# Patient Record
Sex: MALE | Race: WHITE | NOT HISPANIC OR LATINO
[De-identification: names, ages, dates, MRNs, and addresses within clinical notes are randomized per-mention and may not be internally consistent; named-entity substitution may affect disease eponyms.]

---

## 2017-01-30 ENCOUNTER — RESULT REVIEW (OUTPATIENT)
Age: 77
End: 2017-01-30

## 2017-01-30 ENCOUNTER — OUTPATIENT (OUTPATIENT)
Dept: OUTPATIENT SERVICES | Facility: HOSPITAL | Age: 77
LOS: 1 days | Discharge: HOME | End: 2017-01-30

## 2017-01-30 DIAGNOSIS — C49.9 MALIGNANT NEOPLASM OF CONNECTIVE AND SOFT TISSUE, UNSPECIFIED: ICD-10-CM

## 2017-01-30 DIAGNOSIS — C85.90 NON-HODGKIN LYMPHOMA, UNSPECIFIED, UNSPECIFIED SITE: ICD-10-CM

## 2017-01-30 DIAGNOSIS — C61 MALIGNANT NEOPLASM OF PROSTATE: ICD-10-CM

## 2017-01-31 ENCOUNTER — LABORATORY RESULT (OUTPATIENT)
Age: 77
End: 2017-01-31

## 2017-01-31 ENCOUNTER — APPOINTMENT (OUTPATIENT)
Dept: HEMATOLOGY ONCOLOGY | Facility: CLINIC | Age: 77
End: 2017-01-31

## 2017-01-31 ENCOUNTER — OUTPATIENT (OUTPATIENT)
Dept: OUTPATIENT SERVICES | Facility: HOSPITAL | Age: 77
LOS: 1 days | End: 2017-01-31
Payer: MEDICARE

## 2017-01-31 VITALS
WEIGHT: 149.91 LBS | TEMPERATURE: 98.4 F | RESPIRATION RATE: 16 BRPM | OXYGEN SATURATION: 99 % | HEART RATE: 80 BPM | SYSTOLIC BLOOD PRESSURE: 137 MMHG | DIASTOLIC BLOOD PRESSURE: 83 MMHG

## 2017-01-31 DIAGNOSIS — D64.9 ANEMIA, UNSPECIFIED: ICD-10-CM

## 2017-01-31 LAB
BLD GP AB SCN SERPL QL: NEGATIVE — SIGNIFICANT CHANGE UP
DAT POLY-SP REAG RBC QL: NEGATIVE — SIGNIFICANT CHANGE UP
HCT VFR BLD CALC: 32.4 % — LOW (ref 39–50)
HGB BLD-MCNC: 9.7 G/DL — LOW (ref 13–17)
MCHC RBC-ENTMCNC: 24.2 PG — LOW (ref 27–34)
MCHC RBC-ENTMCNC: 30 GM/DL — LOW (ref 32–36)
MCV RBC AUTO: 80.8 FL — SIGNIFICANT CHANGE UP (ref 80–100)
PLATELET # BLD AUTO: 294 K/UL — SIGNIFICANT CHANGE UP (ref 150–400)
RBC # BLD: 4.01 M/UL — LOW (ref 4.2–5.8)
RBC # FLD: 14.4 % — SIGNIFICANT CHANGE UP (ref 10.3–14.5)
RH IG SCN BLD-IMP: NEGATIVE — SIGNIFICANT CHANGE UP
WBC # BLD: 6.1 K/UL — SIGNIFICANT CHANGE UP (ref 3.8–10.5)
WBC # FLD AUTO: 6.1 K/UL — SIGNIFICANT CHANGE UP (ref 3.8–10.5)

## 2017-01-31 PROCEDURE — 86900 BLOOD TYPING SEROLOGIC ABO: CPT

## 2017-01-31 PROCEDURE — 86901 BLOOD TYPING SEROLOGIC RH(D): CPT

## 2017-01-31 PROCEDURE — 86850 RBC ANTIBODY SCREEN: CPT

## 2017-01-31 PROCEDURE — 86880 COOMBS TEST DIRECT: CPT

## 2017-02-02 ENCOUNTER — RESULT REVIEW (OUTPATIENT)
Age: 77
End: 2017-02-02

## 2017-02-03 ENCOUNTER — APPOINTMENT (OUTPATIENT)
Dept: HEMATOLOGY ONCOLOGY | Facility: CLINIC | Age: 77
End: 2017-02-03

## 2017-02-03 LAB
ERYTHROCYTE [SEDIMENTATION RATE] IN BLOOD: 114 MM/HR — HIGH (ref 0–20)
HCT VFR BLD CALC: 29.8 % — LOW (ref 39–50)
HGB BLD-MCNC: 9.3 G/DL — LOW (ref 13–17)
MCHC RBC-ENTMCNC: 25 PG — LOW (ref 27–34)
MCHC RBC-ENTMCNC: 31.1 GM/DL — LOW (ref 32–36)
MCV RBC AUTO: 80.3 FL — SIGNIFICANT CHANGE UP (ref 80–100)
PLATELET # BLD AUTO: 316 K/UL — SIGNIFICANT CHANGE UP (ref 150–400)
RBC # BLD: 3.71 M/UL — LOW (ref 4.2–5.8)
RBC # FLD: 14.3 % — SIGNIFICANT CHANGE UP (ref 10.3–14.5)
RETICS #: 28.6 K/UL — SIGNIFICANT CHANGE UP (ref 25–125)
RETICS/RBC NFR: 0.8 %/6 HR — SIGNIFICANT CHANGE UP (ref 0.5–2.5)
WBC # BLD: 7.4 K/UL — SIGNIFICANT CHANGE UP (ref 3.8–10.5)
WBC # FLD AUTO: 7.4 K/UL — SIGNIFICANT CHANGE UP (ref 3.8–10.5)

## 2017-02-07 LAB
ALBUMIN MFR SERPL ELPH: 39.2 %
ALBUMIN SERPL ELPH-MCNC: 3.3 G/DL
ALBUMIN SERPL-MCNC: 2.6 G/DL
ALBUMIN/GLOB SERPL: 0.6 RATIO
ALP BLD-CCNC: 96 U/L
ALPHA1 GLOB MFR SERPL ELPH: 8.5 %
ALPHA1 GLOB SERPL ELPH-MCNC: 0.6 G/DL
ALPHA2 GLOB MFR SERPL ELPH: 20.8 %
ALPHA2 GLOB SERPL ELPH-MCNC: 1.4 G/DL
ALT SERPL-CCNC: 14 U/L
ANION GAP SERPL CALC-SCNC: 14 MMOL/L
APPEARANCE: CLEAR
AST SERPL-CCNC: 17 U/L
B-GLOBULIN MFR SERPL ELPH: 12.7 %
B-GLOBULIN SERPL ELPH-MCNC: 0.9 G/DL
BACTERIA: NEGATIVE
BILIRUB SERPL-MCNC: 0.2 MG/DL
BILIRUBIN URINE: NEGATIVE
BLOOD URINE: NEGATIVE
BUN SERPL-MCNC: 23 MG/DL
CALCIUM SERPL-MCNC: 8.9 MG/DL
CHLORIDE SERPL-SCNC: 101 MMOL/L
CO2 SERPL-SCNC: 27 MMOL/L
COLOR: YELLOW
CREAT SERPL-MCNC: 1.19 MG/DL
DEPRECATED KAPPA LC FREE/LAMBDA SER: 0.05 RATIO
GAMMA GLOB FLD ELPH-MCNC: 1.3 G/DL
GAMMA GLOB MFR SERPL ELPH: 18.8 %
GLUCOSE QUALITATIVE U: NORMAL MG/DL
GLUCOSE SERPL-MCNC: 95 MG/DL
HAPTOGLOB SERPL-MCNC: 653 MG/DL
HYALINE CASTS: 0 /LPF
IGA SER QL IEP: 152 MG/DL
IGG SER QL IEP: 1350 MG/DL
IGM SER QL IEP: 134 MG/DL
INTERPRETATION SERPL IEP-IMP: NORMAL
IRON SATN MFR SERPL: 8 %
IRON SERPL-MCNC: 16 UG/DL
KAPPA LC CSF-MCNC: 51.5 MG/DL
KAPPA LC SERPL-MCNC: 2.45 MG/DL
KETONES URINE: NEGATIVE
LDH SERPL-CCNC: 282 U/L
LEUKOCYTE ESTERASE URINE: NEGATIVE
M PROTEIN MFR SERPL ELPH: NORMAL %
M PROTEIN SPEC IFE-MCNC: NORMAL
MICROSCOPIC-UA: NORMAL
MONOCLON BAND OBS SERPL: NORMAL G/DL
NITRITE URINE: NEGATIVE
PH URINE: 6
POTASSIUM SERPL-SCNC: 5.2 MMOL/L
PROT SERPL-MCNC: 6.7 G/DL
PROT SERPL-MCNC: 6.7 G/DL
PROT SERPL-MCNC: 6.8 G/DL
PROTEIN URINE: NEGATIVE MG/DL
PSA FREE FLD-MCNC: NORMAL %
PSA FREE SERPL-MCNC: <0.01 NG/ML
PSA SERPL-MCNC: NORMAL NG/ML
RED BLOOD CELLS URINE: 0 /HPF
SODIUM SERPL-SCNC: 142 MMOL/L
SPECIFIC GRAVITY URINE: 1.01
SQUAMOUS EPITHELIAL CELLS: 0 /HPF
TIBC SERPL-MCNC: 191 UG/DL
UIBC SERPL-MCNC: 175 UG/DL
UROBILINOGEN URINE: NORMAL MG/DL
WHITE BLOOD CELLS URINE: 0 /HPF

## 2017-02-08 LAB
ABR COMMENT: NORMAL
PNH GRANULOCYTES: 0 %
PNH MONOCYTES: 0 %
PNH RBC - COMPLETE: 0 %
PNH RBC - PARTIAL: 0 %

## 2017-02-13 ENCOUNTER — FORM ENCOUNTER (OUTPATIENT)
Age: 77
End: 2017-02-13

## 2017-02-14 ENCOUNTER — OUTPATIENT (OUTPATIENT)
Dept: OUTPATIENT SERVICES | Facility: HOSPITAL | Age: 77
LOS: 1 days | End: 2017-02-14
Payer: MEDICARE

## 2017-02-14 ENCOUNTER — APPOINTMENT (OUTPATIENT)
Dept: CT IMAGING | Facility: IMAGING CENTER | Age: 77
End: 2017-02-14

## 2017-02-14 DIAGNOSIS — C61 MALIGNANT NEOPLASM OF PROSTATE: ICD-10-CM

## 2017-02-14 DIAGNOSIS — D64.9 ANEMIA, UNSPECIFIED: ICD-10-CM

## 2017-02-14 DIAGNOSIS — Z85.71 PERSONAL HISTORY OF HODGKIN LYMPHOMA: ICD-10-CM

## 2017-02-14 DIAGNOSIS — C49.9 MALIGNANT NEOPLASM OF CONNECTIVE AND SOFT TISSUE, UNSPECIFIED: ICD-10-CM

## 2017-02-14 PROCEDURE — 71260 CT THORAX DX C+: CPT

## 2017-02-14 PROCEDURE — 74177 CT ABD & PELVIS W/CONTRAST: CPT

## 2017-02-16 ENCOUNTER — APPOINTMENT (OUTPATIENT)
Dept: INFUSION THERAPY | Facility: HOSPITAL | Age: 77
End: 2017-02-16

## 2017-02-17 PROBLEM — C81.90 HODGKIN LYMPHOMA, UNSPECIFIED, UNSPECIFIED SITE: Chronic | Status: ACTIVE | Noted: 2017-02-15

## 2017-02-17 PROBLEM — I34.1 NONRHEUMATIC MITRAL (VALVE) PROLAPSE: Chronic | Status: ACTIVE | Noted: 2017-02-15

## 2017-02-17 PROBLEM — C61 MALIGNANT NEOPLASM OF PROSTATE: Chronic | Status: ACTIVE | Noted: 2017-02-15

## 2017-02-17 PROBLEM — C49.9 MALIGNANT NEOPLASM OF CONNECTIVE AND SOFT TISSUE, UNSPECIFIED: Chronic | Status: ACTIVE | Noted: 2017-02-15

## 2017-02-21 DIAGNOSIS — D50.9 IRON DEFICIENCY ANEMIA, UNSPECIFIED: ICD-10-CM

## 2017-02-23 ENCOUNTER — APPOINTMENT (OUTPATIENT)
Dept: INFUSION THERAPY | Facility: HOSPITAL | Age: 77
End: 2017-02-23

## 2017-02-28 ENCOUNTER — OUTPATIENT (OUTPATIENT)
Dept: OUTPATIENT SERVICES | Facility: HOSPITAL | Age: 77
LOS: 1 days | Discharge: HOME | End: 2017-02-28

## 2017-02-28 DIAGNOSIS — Z90.79 ACQUIRED ABSENCE OF OTHER GENITAL ORGAN(S): Chronic | ICD-10-CM

## 2017-02-28 DIAGNOSIS — Z98.890 OTHER SPECIFIED POSTPROCEDURAL STATES: Chronic | ICD-10-CM

## 2017-02-28 DIAGNOSIS — C85.90 NON-HODGKIN LYMPHOMA, UNSPECIFIED, UNSPECIFIED SITE: ICD-10-CM

## 2017-03-01 ENCOUNTER — RESULT REVIEW (OUTPATIENT)
Age: 77
End: 2017-03-01

## 2017-03-02 ENCOUNTER — APPOINTMENT (OUTPATIENT)
Dept: HEMATOLOGY ONCOLOGY | Facility: CLINIC | Age: 77
End: 2017-03-02

## 2017-03-02 ENCOUNTER — APPOINTMENT (OUTPATIENT)
Dept: SURGICAL ONCOLOGY | Facility: CLINIC | Age: 77
End: 2017-03-02

## 2017-03-02 VITALS
OXYGEN SATURATION: 98 % | SYSTOLIC BLOOD PRESSURE: 135 MMHG | BODY MASS INDEX: 24.66 KG/M2 | HEIGHT: 65 IN | HEART RATE: 78 BPM | DIASTOLIC BLOOD PRESSURE: 73 MMHG | RESPIRATION RATE: 16 BRPM | WEIGHT: 148 LBS

## 2017-03-02 DIAGNOSIS — N42.9 DISORDER OF PROSTATE, UNSPECIFIED: ICD-10-CM

## 2017-03-02 DIAGNOSIS — E07.9 DISORDER OF THYROID, UNSPECIFIED: ICD-10-CM

## 2017-03-02 DIAGNOSIS — N40.0 BENIGN PROSTATIC HYPERPLASIA WITHOUT LOWER URINARY TRACT SYMPMS: ICD-10-CM

## 2017-03-02 DIAGNOSIS — Z86.79 PERSONAL HISTORY OF OTHER DISEASES OF THE CIRCULATORY SYSTEM: ICD-10-CM

## 2017-03-02 DIAGNOSIS — R59.9 ENLARGED LYMPH NODES, UNSPECIFIED: ICD-10-CM

## 2017-03-02 DIAGNOSIS — Z87.2 PERSONAL HISTORY OF DISEASES OF THE SKIN AND SUBCUTANEOUS TISSUE: ICD-10-CM

## 2017-03-02 LAB
HCT VFR BLD CALC: 34.9 % — LOW (ref 39–50)
HGB BLD-MCNC: 11.2 G/DL — LOW (ref 13–17)
MCHC RBC-ENTMCNC: 25.9 PG — LOW (ref 27–34)
MCHC RBC-ENTMCNC: 32.1 G/DL — SIGNIFICANT CHANGE UP (ref 32–36)
MCV RBC AUTO: 80.6 FL — SIGNIFICANT CHANGE UP (ref 80–100)
PLATELET # BLD AUTO: 314 K/UL — SIGNIFICANT CHANGE UP (ref 150–400)
RBC # BLD: 4.34 M/UL — SIGNIFICANT CHANGE UP (ref 4.2–5.8)
RBC # FLD: 17.2 % — HIGH (ref 10.3–14.5)
WBC # BLD: 8 K/UL — SIGNIFICANT CHANGE UP (ref 3.8–10.5)
WBC # FLD AUTO: 8 K/UL — SIGNIFICANT CHANGE UP (ref 3.8–10.5)

## 2017-03-19 ENCOUNTER — APPOINTMENT (OUTPATIENT)
Dept: NUCLEAR MEDICINE | Facility: CLINIC | Age: 77
End: 2017-03-19

## 2017-03-19 ENCOUNTER — OUTPATIENT (OUTPATIENT)
Dept: OUTPATIENT SERVICES | Facility: HOSPITAL | Age: 77
LOS: 1 days | End: 2017-03-19
Payer: MEDICARE

## 2017-03-19 DIAGNOSIS — C85.90 NON-HODGKIN LYMPHOMA, UNSPECIFIED, UNSPECIFIED SITE: ICD-10-CM

## 2017-03-19 DIAGNOSIS — Z98.890 OTHER SPECIFIED POSTPROCEDURAL STATES: Chronic | ICD-10-CM

## 2017-03-19 DIAGNOSIS — Z90.79 ACQUIRED ABSENCE OF OTHER GENITAL ORGAN(S): Chronic | ICD-10-CM

## 2017-03-19 PROCEDURE — A9552: CPT

## 2017-03-19 PROCEDURE — 78815 PET IMAGE W/CT SKULL-THIGH: CPT

## 2017-03-23 ENCOUNTER — OUTPATIENT (OUTPATIENT)
Dept: OUTPATIENT SERVICES | Facility: HOSPITAL | Age: 77
LOS: 1 days | Discharge: HOME | End: 2017-03-23

## 2017-03-23 VITALS
RESPIRATION RATE: 20 BRPM | TEMPERATURE: 97 F | DIASTOLIC BLOOD PRESSURE: 79 MMHG | WEIGHT: 147.93 LBS | HEIGHT: 65 IN | HEART RATE: 81 BPM | OXYGEN SATURATION: 100 % | SYSTOLIC BLOOD PRESSURE: 148 MMHG

## 2017-03-23 DIAGNOSIS — Z90.79 ACQUIRED ABSENCE OF OTHER GENITAL ORGAN(S): Chronic | ICD-10-CM

## 2017-03-23 DIAGNOSIS — Z98.890 OTHER SPECIFIED POSTPROCEDURAL STATES: Chronic | ICD-10-CM

## 2017-03-23 DIAGNOSIS — Z90.89 ACQUIRED ABSENCE OF OTHER ORGANS: Chronic | ICD-10-CM

## 2017-03-23 DIAGNOSIS — Z85.831 PERSONAL HISTORY OF MALIGNANT NEOPLASM OF SOFT TISSUE: Chronic | ICD-10-CM

## 2017-03-23 LAB
ANION GAP SERPL CALC-SCNC: 9 MMOL/L — SIGNIFICANT CHANGE UP (ref 5–17)
APTT BLD: 36.2 SEC — SIGNIFICANT CHANGE UP (ref 27.5–37.4)
BASOPHILS # BLD AUTO: 0.1 K/UL — SIGNIFICANT CHANGE UP (ref 0–0.2)
BASOPHILS NFR BLD AUTO: 1.3 % — SIGNIFICANT CHANGE UP (ref 0–2)
BUN SERPL-MCNC: 24 MG/DL — HIGH (ref 7–23)
CALCIUM SERPL-MCNC: 9.2 MG/DL — SIGNIFICANT CHANGE UP (ref 8.5–10.1)
CHLORIDE SERPL-SCNC: 102 MMOL/L — SIGNIFICANT CHANGE UP (ref 96–108)
CO2 SERPL-SCNC: 29 MMOL/L — SIGNIFICANT CHANGE UP (ref 22–31)
CREAT SERPL-MCNC: 1.1 MG/DL — SIGNIFICANT CHANGE UP (ref 0.5–1.3)
EOSINOPHIL # BLD AUTO: 0.3 K/UL — SIGNIFICANT CHANGE UP (ref 0–0.5)
EOSINOPHIL NFR BLD AUTO: 3.6 % — SIGNIFICANT CHANGE UP (ref 0–6)
GLUCOSE SERPL-MCNC: 96 MG/DL — SIGNIFICANT CHANGE UP (ref 70–99)
HCT VFR BLD CALC: 36.8 % — LOW (ref 39–50)
HGB BLD-MCNC: 11.3 G/DL — LOW (ref 13–17)
INR BLD: 1.14 RATIO — SIGNIFICANT CHANGE UP (ref 0.88–1.16)
LYMPHOCYTES # BLD AUTO: 1.7 K/UL — SIGNIFICANT CHANGE UP (ref 1–3.3)
LYMPHOCYTES # BLD AUTO: 23.9 % — SIGNIFICANT CHANGE UP (ref 13–44)
MCHC RBC-ENTMCNC: 25.4 PG — LOW (ref 27–34)
MCHC RBC-ENTMCNC: 30.8 GM/DL — LOW (ref 32–36)
MCV RBC AUTO: 82.5 FL — SIGNIFICANT CHANGE UP (ref 80–100)
MONOCYTES # BLD AUTO: 1.2 K/UL — HIGH (ref 0–0.9)
MONOCYTES NFR BLD AUTO: 16.6 % — HIGH (ref 2–14)
NEUTROPHILS # BLD AUTO: 3.9 K/UL — SIGNIFICANT CHANGE UP (ref 1.8–7.4)
NEUTROPHILS NFR BLD AUTO: 54.5 % — SIGNIFICANT CHANGE UP (ref 43–77)
PLATELET # BLD AUTO: 343 K/UL — SIGNIFICANT CHANGE UP (ref 150–400)
POTASSIUM SERPL-MCNC: 4.5 MMOL/L — SIGNIFICANT CHANGE UP (ref 3.5–5.3)
POTASSIUM SERPL-SCNC: 4.5 MMOL/L — SIGNIFICANT CHANGE UP (ref 3.5–5.3)
PROTHROM AB SERPL-ACNC: 12.4 SEC — SIGNIFICANT CHANGE UP (ref 9.8–12.7)
RBC # BLD: 4.46 M/UL — SIGNIFICANT CHANGE UP (ref 4.2–5.8)
RBC # FLD: 19.2 % — HIGH (ref 10.3–14.5)
SODIUM SERPL-SCNC: 140 MMOL/L — SIGNIFICANT CHANGE UP (ref 135–145)
WBC # BLD: 7.1 K/UL — SIGNIFICANT CHANGE UP (ref 3.8–10.5)
WBC # FLD AUTO: 7.1 K/UL — SIGNIFICANT CHANGE UP (ref 3.8–10.5)

## 2017-03-23 NOTE — H&P PST ADULT - FAMILY HISTORY
Father  Still living? No  Family history of heart disease, Age at diagnosis: Age Unknown     Aunt  Still living? No  Family history of diabetes mellitus, Age at diagnosis: Age Unknown     Mother  Still living? No  Family history of multiple sclerosis, Age at diagnosis: Age Unknown     Sibling  Still living? No  Family history of Parkinson disease, Age at diagnosis: Age Unknown

## 2017-03-23 NOTE — H&P PST ADULT - PSH
H/O bilateral inguinal hernia repair  1991  H/O prostatectomy  2001  History of sarcoma  excision - 1993  S/P T&A (status post tonsillectomy and adenoidectomy)  8 y/o

## 2017-03-23 NOTE — H&P PST ADULT - PMH
Anemia  iron deficiency  Basal cell carcinoma  face, head, neck  Enlarged lymph node    Hodgkin disease    Hypothyroidism    MVP (mitral valve prolapse)    Prostate CA    Sarcoma  inguinal

## 2017-03-23 NOTE — H&P PST ADULT - HISTORY OF PRESENT ILLNESS
76 y/o male with iron deficiency anemia. Pt with history of hodgkin, sarcoma, prostate cancer. Pt stated "my doctor sent me for CAT scan because my hemoglobin kept dropping." Abdominal CAT scan showed enlarged lymph node. Denies abdominal pain. Here today for PST for EGD and endoscopic ultrasound. 78 y/o male with iron deficiency anemia. Pt with history of hodgkin's, sarcoma, prostate cancer. Pt stated "my doctor sent me for CAT scan because my hemoglobin kept dropping." Abdominal CAT scan showed enlarged lymph node. Denies abdominal pain. Here today for PST for EGD and endoscopic ultrasound.

## 2017-03-23 NOTE — H&P PST ADULT - ASSESSMENT
76 y/o male with iron deficiency anemia and enlarged lymph node. Pt with history of hodgkin, sarcoma, prostate cancer. Scheduled for EGD and endoscopic ultrasound with Dr. Boucher. 78 y/o male with iron deficiency anemia and enlarged lymph node. Pt with history of hodgkin's, sarcoma, prostate cancer. Scheduled for EGD and endoscopic ultrasound with Dr. Boucher.    Plan  1. Stop all NSAIDS, herbal supplements and vitamins for 7 days.  2. NPO at midnight.  3. Take the following medication (levothyroxine) with small sips of water on the morning of your procedure/surgery.

## 2017-03-23 NOTE — H&P PST ADULT - NSANTHOSAYNRD_GEN_A_CORE
No. NIDIA screening performed.  STOP BANG Legend: 0-2 = LOW Risk; 3-4 = INTERMEDIATE Risk; 5-8 = HIGH Risk

## 2017-03-28 ENCOUNTER — RESULT REVIEW (OUTPATIENT)
Age: 77
End: 2017-03-28

## 2017-03-29 ENCOUNTER — OUTPATIENT (OUTPATIENT)
Dept: OUTPATIENT SERVICES | Facility: HOSPITAL | Age: 77
LOS: 1 days | Discharge: HOME | End: 2017-03-29
Payer: MEDICARE

## 2017-03-29 VITALS
SYSTOLIC BLOOD PRESSURE: 153 MMHG | HEART RATE: 80 BPM | WEIGHT: 147.93 LBS | RESPIRATION RATE: 27 BRPM | DIASTOLIC BLOOD PRESSURE: 86 MMHG | OXYGEN SATURATION: 98 % | HEIGHT: 65 IN | TEMPERATURE: 97 F

## 2017-03-29 DIAGNOSIS — Z98.890 OTHER SPECIFIED POSTPROCEDURAL STATES: Chronic | ICD-10-CM

## 2017-03-29 DIAGNOSIS — Z85.831 PERSONAL HISTORY OF MALIGNANT NEOPLASM OF SOFT TISSUE: Chronic | ICD-10-CM

## 2017-03-29 DIAGNOSIS — Z90.79 ACQUIRED ABSENCE OF OTHER GENITAL ORGAN(S): Chronic | ICD-10-CM

## 2017-03-29 DIAGNOSIS — Z90.89 ACQUIRED ABSENCE OF OTHER ORGANS: Chronic | ICD-10-CM

## 2017-03-29 PROCEDURE — 88312 SPECIAL STAINS GROUP 1: CPT | Mod: 26

## 2017-03-29 PROCEDURE — 88307 TISSUE EXAM BY PATHOLOGIST: CPT | Mod: 26

## 2017-03-29 PROCEDURE — 88172 CYTP DX EVAL FNA 1ST EA SITE: CPT | Mod: 26

## 2017-03-29 PROCEDURE — 88173 CYTOPATH EVAL FNA REPORT: CPT | Mod: 26

## 2017-03-29 PROCEDURE — 88305 TISSUE EXAM BY PATHOLOGIST: CPT | Mod: 26

## 2017-03-29 RX ORDER — SODIUM CHLORIDE 9 MG/ML
1000 INJECTION INTRAMUSCULAR; INTRAVENOUS; SUBCUTANEOUS
Qty: 0 | Refills: 0 | Status: DISCONTINUED | OUTPATIENT
Start: 2017-03-29 | End: 2017-04-13

## 2017-04-06 ENCOUNTER — APPOINTMENT (OUTPATIENT)
Dept: SURGICAL ONCOLOGY | Facility: CLINIC | Age: 77
End: 2017-04-06

## 2017-04-12 LAB — SURGICAL PATHOLOGY FINAL REPORT - CH: SIGNIFICANT CHANGE UP

## 2017-04-13 DIAGNOSIS — K31.7 POLYP OF STOMACH AND DUODENUM: ICD-10-CM

## 2017-04-13 DIAGNOSIS — Z88.0 ALLERGY STATUS TO PENICILLIN: ICD-10-CM

## 2017-04-13 DIAGNOSIS — K44.9 DIAPHRAGMATIC HERNIA WITHOUT OBSTRUCTION OR GANGRENE: ICD-10-CM

## 2017-04-13 DIAGNOSIS — Z88.1 ALLERGY STATUS TO OTHER ANTIBIOTIC AGENTS STATUS: ICD-10-CM

## 2017-04-17 ENCOUNTER — RESULT REVIEW (OUTPATIENT)
Age: 77
End: 2017-04-17

## 2017-04-18 ENCOUNTER — OUTPATIENT (OUTPATIENT)
Dept: OUTPATIENT SERVICES | Facility: HOSPITAL | Age: 77
LOS: 1 days | Discharge: HOME | End: 2017-04-18
Payer: MEDICARE

## 2017-04-18 ENCOUNTER — INPATIENT (INPATIENT)
Facility: HOSPITAL | Age: 77
LOS: 2 days | Discharge: HOME | End: 2017-04-21
Attending: INTERNAL MEDICINE | Admitting: INTERNAL MEDICINE
Payer: MEDICARE

## 2017-04-18 VITALS
RESPIRATION RATE: 22 BRPM | SYSTOLIC BLOOD PRESSURE: 140 MMHG | WEIGHT: 151.02 LBS | TEMPERATURE: 97 F | DIASTOLIC BLOOD PRESSURE: 87 MMHG | HEIGHT: 65 IN | OXYGEN SATURATION: 99 % | HEART RATE: 82 BPM

## 2017-04-18 VITALS
SYSTOLIC BLOOD PRESSURE: 176 MMHG | WEIGHT: 149.03 LBS | HEART RATE: 120 BPM | OXYGEN SATURATION: 94 % | DIASTOLIC BLOOD PRESSURE: 77 MMHG | TEMPERATURE: 101 F | RESPIRATION RATE: 20 BRPM | HEIGHT: 65 IN

## 2017-04-18 DIAGNOSIS — Z90.79 ACQUIRED ABSENCE OF OTHER GENITAL ORGAN(S): Chronic | ICD-10-CM

## 2017-04-18 DIAGNOSIS — Z85.831 PERSONAL HISTORY OF MALIGNANT NEOPLASM OF SOFT TISSUE: Chronic | ICD-10-CM

## 2017-04-18 DIAGNOSIS — Z90.89 ACQUIRED ABSENCE OF OTHER ORGANS: Chronic | ICD-10-CM

## 2017-04-18 DIAGNOSIS — Z98.890 OTHER SPECIFIED POSTPROCEDURAL STATES: Chronic | ICD-10-CM

## 2017-04-18 LAB
ADD ON TEST-SPECIMEN IN LAB: SIGNIFICANT CHANGE UP
ALBUMIN SERPL ELPH-MCNC: 2.5 G/DL — LOW (ref 3.3–5)
ALP SERPL-CCNC: 122 U/L — HIGH (ref 40–120)
ALT FLD-CCNC: 22 U/L — SIGNIFICANT CHANGE UP (ref 12–78)
ANION GAP SERPL CALC-SCNC: 9 MMOL/L — SIGNIFICANT CHANGE UP (ref 5–17)
APPEARANCE UR: CLEAR — SIGNIFICANT CHANGE UP
APTT BLD: 31.1 SEC — SIGNIFICANT CHANGE UP (ref 27.5–37.4)
AST SERPL-CCNC: 16 U/L — SIGNIFICANT CHANGE UP (ref 15–37)
BASOPHILS # BLD AUTO: 0.1 K/UL — SIGNIFICANT CHANGE UP (ref 0–0.2)
BASOPHILS NFR BLD AUTO: 0.7 % — SIGNIFICANT CHANGE UP (ref 0–2)
BILIRUB SERPL-MCNC: 0.2 MG/DL — SIGNIFICANT CHANGE UP (ref 0.2–1.2)
BILIRUB UR-MCNC: NEGATIVE — SIGNIFICANT CHANGE UP
BUN SERPL-MCNC: 19 MG/DL — SIGNIFICANT CHANGE UP (ref 7–23)
CALCIUM SERPL-MCNC: 8.9 MG/DL — SIGNIFICANT CHANGE UP (ref 8.5–10.1)
CHLORIDE SERPL-SCNC: 105 MMOL/L — SIGNIFICANT CHANGE UP (ref 96–108)
CO2 SERPL-SCNC: 26 MMOL/L — SIGNIFICANT CHANGE UP (ref 22–31)
COLOR SPEC: YELLOW — SIGNIFICANT CHANGE UP
CREAT SERPL-MCNC: 1.08 MG/DL — SIGNIFICANT CHANGE UP (ref 0.5–1.3)
DIFF PNL FLD: NEGATIVE — SIGNIFICANT CHANGE UP
EOSINOPHIL # BLD AUTO: 0.1 K/UL — SIGNIFICANT CHANGE UP (ref 0–0.5)
EOSINOPHIL NFR BLD AUTO: 0.8 % — SIGNIFICANT CHANGE UP (ref 0–6)
GLUCOSE SERPL-MCNC: 110 MG/DL — HIGH (ref 70–99)
GLUCOSE UR QL: NEGATIVE MG/DL — SIGNIFICANT CHANGE UP
HCT VFR BLD CALC: 35.7 % — LOW (ref 39–50)
HGB BLD-MCNC: 11 G/DL — LOW (ref 13–17)
INR BLD: 1.2 RATIO — HIGH (ref 0.88–1.16)
KETONES UR-MCNC: NEGATIVE — SIGNIFICANT CHANGE UP
LACTATE SERPL-SCNC: 1.6 MMOL/L — SIGNIFICANT CHANGE UP (ref 0.7–2)
LEUKOCYTE ESTERASE UR-ACNC: NEGATIVE — SIGNIFICANT CHANGE UP
LIDOCAIN IGE QN: 222 U/L — SIGNIFICANT CHANGE UP (ref 73–393)
LYMPHOCYTES # BLD AUTO: 1.1 K/UL — SIGNIFICANT CHANGE UP (ref 1–3.3)
LYMPHOCYTES # BLD AUTO: 9.7 % — LOW (ref 13–44)
MCHC RBC-ENTMCNC: 25.6 PG — LOW (ref 27–34)
MCHC RBC-ENTMCNC: 30.8 GM/DL — LOW (ref 32–36)
MCV RBC AUTO: 83.2 FL — SIGNIFICANT CHANGE UP (ref 80–100)
MONOCYTES # BLD AUTO: 0.6 K/UL — SIGNIFICANT CHANGE UP (ref 0–0.9)
MONOCYTES NFR BLD AUTO: 5.2 % — SIGNIFICANT CHANGE UP (ref 2–14)
NEUTROPHILS # BLD AUTO: 9.7 K/UL — HIGH (ref 1.8–7.4)
NEUTROPHILS NFR BLD AUTO: 83.7 % — HIGH (ref 43–77)
NITRITE UR-MCNC: NEGATIVE — SIGNIFICANT CHANGE UP
PH UR: 6 — SIGNIFICANT CHANGE UP (ref 5–8)
PLATELET # BLD AUTO: 296 K/UL — SIGNIFICANT CHANGE UP (ref 150–400)
POTASSIUM SERPL-MCNC: 4.2 MMOL/L — SIGNIFICANT CHANGE UP (ref 3.5–5.3)
POTASSIUM SERPL-SCNC: 4.2 MMOL/L — SIGNIFICANT CHANGE UP (ref 3.5–5.3)
PROT SERPL-MCNC: 8.2 GM/DL — SIGNIFICANT CHANGE UP (ref 6–8.3)
PROT UR-MCNC: NEGATIVE MG/DL — SIGNIFICANT CHANGE UP
PROTHROM AB SERPL-ACNC: 13 SEC — HIGH (ref 9.8–12.7)
RAPID RVP RESULT: SIGNIFICANT CHANGE UP
RBC # BLD: 4.29 M/UL — SIGNIFICANT CHANGE UP (ref 4.2–5.8)
RBC # FLD: 18.1 % — HIGH (ref 10.3–14.5)
SODIUM SERPL-SCNC: 140 MMOL/L — SIGNIFICANT CHANGE UP (ref 135–145)
SP GR SPEC: 1 — LOW (ref 1.01–1.02)
TSH SERPL-MCNC: 1.88 UIU/ML — SIGNIFICANT CHANGE UP (ref 0.36–3.74)
UROBILINOGEN FLD QL: NEGATIVE MG/DL — SIGNIFICANT CHANGE UP
WBC # BLD: 11.6 K/UL — HIGH (ref 3.8–10.5)
WBC # FLD AUTO: 11.6 K/UL — HIGH (ref 3.8–10.5)

## 2017-04-18 PROCEDURE — 88365 INSITU HYBRIDIZATION (FISH): CPT | Mod: 26

## 2017-04-18 PROCEDURE — 88307 TISSUE EXAM BY PATHOLOGIST: CPT | Mod: 26

## 2017-04-18 PROCEDURE — 99285 EMERGENCY DEPT VISIT HI MDM: CPT

## 2017-04-18 PROCEDURE — 74177 CT ABD & PELVIS W/CONTRAST: CPT | Mod: 26

## 2017-04-18 PROCEDURE — 88187 FLOWCYTOMETRY/READ 2-8: CPT

## 2017-04-18 PROCEDURE — 71020: CPT | Mod: 26

## 2017-04-18 RX ORDER — CEFEPIME 1 G/1
1000 INJECTION, POWDER, FOR SOLUTION INTRAMUSCULAR; INTRAVENOUS ONCE
Qty: 0 | Refills: 0 | Status: COMPLETED | OUTPATIENT
Start: 2017-04-18 | End: 2017-04-18

## 2017-04-18 RX ORDER — SODIUM CHLORIDE 9 MG/ML
1000 INJECTION INTRAMUSCULAR; INTRAVENOUS; SUBCUTANEOUS
Qty: 0 | Refills: 0 | Status: DISCONTINUED | OUTPATIENT
Start: 2017-04-18 | End: 2017-05-03

## 2017-04-18 RX ORDER — SODIUM CHLORIDE 9 MG/ML
3 INJECTION INTRAMUSCULAR; INTRAVENOUS; SUBCUTANEOUS ONCE
Qty: 0 | Refills: 0 | Status: COMPLETED | OUTPATIENT
Start: 2017-04-18 | End: 2017-04-18

## 2017-04-18 RX ORDER — ACETAMINOPHEN 500 MG
650 TABLET ORAL ONCE
Qty: 0 | Refills: 0 | Status: COMPLETED | OUTPATIENT
Start: 2017-04-18 | End: 2017-04-18

## 2017-04-18 RX ORDER — SODIUM CHLORIDE 9 MG/ML
2200 INJECTION INTRAMUSCULAR; INTRAVENOUS; SUBCUTANEOUS ONCE
Qty: 0 | Refills: 0 | Status: COMPLETED | OUTPATIENT
Start: 2017-04-18 | End: 2017-04-18

## 2017-04-18 RX ADMIN — Medication 650 MILLIGRAM(S): at 19:15

## 2017-04-18 RX ADMIN — SODIUM CHLORIDE 3 MILLILITER(S): 9 INJECTION INTRAMUSCULAR; INTRAVENOUS; SUBCUTANEOUS at 19:00

## 2017-04-18 RX ADMIN — CEFEPIME 100 MILLIGRAM(S): 1 INJECTION, POWDER, FOR SOLUTION INTRAMUSCULAR; INTRAVENOUS at 19:15

## 2017-04-18 RX ADMIN — SODIUM CHLORIDE 2200 MILLILITER(S): 9 INJECTION INTRAMUSCULAR; INTRAVENOUS; SUBCUTANEOUS at 18:50

## 2017-04-18 NOTE — ASU PATIENT PROFILE, ADULT - PSH
H/O bilateral inguinal hernia repair  1991  H/O prostatectomy  2001  History of sarcoma  excision - 1993  S/P hernia repair    S/P prostatectomy    S/P T&A (status post tonsillectomy and adenoidectomy)  8 y/o

## 2017-04-18 NOTE — ED PROVIDER NOTE - NS ED MD SCRIBE ATTENDING SCRIBE SECTIONS
DISPOSITION/RESULTS/HISTORY OF PRESENT ILLNESS/PROGRESS NOTE/INTAKE ASSESSMENT/SCREENINGS/CONSULTATIONS/SHIFT CHANGE/REVIEW OF SYSTEMS/HIV/PAST MEDICAL/SURGICAL/SOCIAL HISTORY/PHYSICAL EXAM

## 2017-04-18 NOTE — ED PROVIDER NOTE - CHPI ED SYMPTOMS NEG
no nausea/no diarrhea/no abdominal distension/no burning urination/no hematuria/no blood in stool/no dysuria/no vomiting

## 2017-04-18 NOTE — ED ADULT NURSE NOTE - CHPI ED SYMPTOMS NEG
no nausea/no weakness/no tingling/no decreased eating/drinking/no dizziness/no pain/no vomiting/no numbness

## 2017-04-18 NOTE — ASU PATIENT PROFILE, ADULT - PMH
Anemia  iron deficiency  Basal cell carcinoma  face, head, neck  Enlarged lymph node    Fibrosarcoma    Hodgkin disease    Hodgkin's disease    Hypothyroidism    Mitral valve prolapse    MVP (mitral valve prolapse)    Prostate CA    Prostate CA    Sarcoma  inguinal

## 2017-04-18 NOTE — ED STATDOCS - DETAILS:
I, Sukumar Mix, performed the initial face to face bedside interview with this patient regarding history of present illness, review of symptoms and relevant past medical, social and family history.  I completed an independent physical examination.  I was the initial provider who evaluated this patient.  The history, relevant review of systems, past medical and surgical history, medical decision making, and physical examination was documented by the scribe in my presence and I attest to the accuracy of the documentation.

## 2017-04-18 NOTE — ED ADULT NURSE NOTE - OBJECTIVE STATEMENT
pt presenting to ED s/p endoscopy today. pt states when he got home he began to feel chills and a fever. took b/p and HR at a pharmacy and noticed his HR was elevated.

## 2017-04-18 NOTE — ED PROVIDER NOTE - SKIN, MLM
Warm to touch on examination. Skin normal color for race, warm, dry and intact. No evidence of rash.

## 2017-04-18 NOTE — ED PROVIDER NOTE - PSH
H/O bilateral inguinal hernia repair  1991  H/O prostatectomy  2001  History of sarcoma  excision - 1993  S/P hernia repair    S/P prostatectomy    S/P T&A (status post tonsillectomy and adenoidectomy)  6 y/o

## 2017-04-18 NOTE — ED STATDOCS - MEDICAL DECISION MAKING DETAILS
Further eval in Main ED. Please note that orders have been placed by Dr. Mix Further eval in Main ED. Please note that orders have been placed by Dr. Dominguez Mix MDM:  Sepsis orders placed.  Antibiotic allergies.  Crackles in lungs.  WIll given Cefepime to start, concern for PNA, but also given recent EGD with biopsy will get CT of abd/pelvis given RUQ TTP.  To Main ED. Further eval in Main ED. Please note that orders have been placed by Dr. Dominguez Mix MDM:  Sepsis orders placed.  Antibiotic allergies.  Crackles in lungs.  Will given Cefepime to start, concern for PNA, but also given recent EGD with biopsy will get CT of abd/pelvis given RUQ TTP.  To Main ED.

## 2017-04-18 NOTE — ED STATDOCS - PROGRESS NOTE DETAILS
Citlaly Tamayo: 78 y/o M PMHx Hodgkins Lymphoma treated in 1988, prostate ca, sarcoma, presents to the ED s/p fever and chills. The pt provides that he had an endoscopy done by Dr. Mueller for an enlarged LN. Last endoscopy was done on 3/29. Pt notes that he started to have chills today after the procedure and checked BP and pulse at a pharmacy and notes that he had a high pulse. Pt currently has a fever in ED. No h/o dysuria, diarrhea, or other complaints. Further eval in Main ED. Please note that orders have been placed by Dr. Mix

## 2017-04-18 NOTE — ED PROVIDER NOTE - OBJECTIVE STATEMENT
76 y/o M PMHx Iron def anemia, hypothyroid, fibrosarcoma, Hodgkins Lymphoma treated in 1988, prostate ca, BCC, presents to the ED s/p fever and chills. The pt provides that he had an endoscopy done by Dr. Mueller for an enlarged LN. Last endoscopy was done on 3/29. Pt notes that he started to have chills today after the procedure and checked BP and pulse at a pharmacy and notes that he had a high pulse. Pt currently has a fever in ED. No h/o dysuria, diarrhea, or other complaints.

## 2017-04-19 LAB
ALBUMIN SERPL ELPH-MCNC: 2.1 G/DL — LOW (ref 3.3–5)
ALP SERPL-CCNC: 106 U/L — SIGNIFICANT CHANGE UP (ref 40–120)
ALT FLD-CCNC: 16 U/L — SIGNIFICANT CHANGE UP (ref 12–78)
ANION GAP SERPL CALC-SCNC: 10 MMOL/L — SIGNIFICANT CHANGE UP (ref 5–17)
AST SERPL-CCNC: 18 U/L — SIGNIFICANT CHANGE UP (ref 15–37)
BASOPHILS # BLD AUTO: 0.1 K/UL — SIGNIFICANT CHANGE UP (ref 0–0.2)
BASOPHILS NFR BLD AUTO: 0.5 % — SIGNIFICANT CHANGE UP (ref 0–2)
BILIRUB SERPL-MCNC: 0.4 MG/DL — SIGNIFICANT CHANGE UP (ref 0.2–1.2)
BUN SERPL-MCNC: 15 MG/DL — SIGNIFICANT CHANGE UP (ref 7–23)
CALCIUM SERPL-MCNC: 8.6 MG/DL — SIGNIFICANT CHANGE UP (ref 8.5–10.1)
CHLORIDE SERPL-SCNC: 111 MMOL/L — HIGH (ref 96–108)
CO2 SERPL-SCNC: 24 MMOL/L — SIGNIFICANT CHANGE UP (ref 22–31)
CREAT SERPL-MCNC: 1.03 MG/DL — SIGNIFICANT CHANGE UP (ref 0.5–1.3)
EOSINOPHIL # BLD AUTO: 0.1 K/UL — SIGNIFICANT CHANGE UP (ref 0–0.5)
EOSINOPHIL NFR BLD AUTO: 0.9 % — SIGNIFICANT CHANGE UP (ref 0–6)
GLUCOSE SERPL-MCNC: 93 MG/DL — SIGNIFICANT CHANGE UP (ref 70–99)
HCT VFR BLD CALC: 32.6 % — LOW (ref 39–50)
HGB BLD-MCNC: 10.1 G/DL — LOW (ref 13–17)
INR BLD: 1.35 RATIO — HIGH (ref 0.88–1.16)
LACTATE SERPL-SCNC: 0.8 MMOL/L — SIGNIFICANT CHANGE UP (ref 0.7–2)
LYMPHOCYTES # BLD AUTO: 1 K/UL — SIGNIFICANT CHANGE UP (ref 1–3.3)
LYMPHOCYTES # BLD AUTO: 8 % — LOW (ref 13–44)
MAGNESIUM SERPL-MCNC: 2.1 MG/DL — SIGNIFICANT CHANGE UP (ref 1.8–2.4)
MCHC RBC-ENTMCNC: 25.6 PG — LOW (ref 27–34)
MCHC RBC-ENTMCNC: 31 GM/DL — LOW (ref 32–36)
MCV RBC AUTO: 82.6 FL — SIGNIFICANT CHANGE UP (ref 80–100)
MONOCYTES # BLD AUTO: 0.9 K/UL — SIGNIFICANT CHANGE UP (ref 0–0.9)
MONOCYTES NFR BLD AUTO: 7.4 % — SIGNIFICANT CHANGE UP (ref 2–14)
NEUTROPHILS # BLD AUTO: 10.7 K/UL — HIGH (ref 1.8–7.4)
NEUTROPHILS NFR BLD AUTO: 83.2 % — HIGH (ref 43–77)
PHOSPHATE SERPL-MCNC: 2.6 MG/DL — SIGNIFICANT CHANGE UP (ref 2.5–4.5)
PLATELET # BLD AUTO: 255 K/UL — SIGNIFICANT CHANGE UP (ref 150–400)
POTASSIUM SERPL-MCNC: 4.1 MMOL/L — SIGNIFICANT CHANGE UP (ref 3.5–5.3)
POTASSIUM SERPL-SCNC: 4.1 MMOL/L — SIGNIFICANT CHANGE UP (ref 3.5–5.3)
PROT SERPL-MCNC: 7.3 GM/DL — SIGNIFICANT CHANGE UP (ref 6–8.3)
PROTHROM AB SERPL-ACNC: 14.7 SEC — HIGH (ref 9.8–12.7)
RBC # BLD: 3.94 M/UL — LOW (ref 4.2–5.8)
RBC # FLD: 18 % — HIGH (ref 10.3–14.5)
SODIUM SERPL-SCNC: 145 MMOL/L — SIGNIFICANT CHANGE UP (ref 135–145)
WBC # BLD: 12.8 K/UL — HIGH (ref 3.8–10.5)
WBC # FLD AUTO: 12.8 K/UL — HIGH (ref 3.8–10.5)

## 2017-04-19 PROCEDURE — 93010 ELECTROCARDIOGRAM REPORT: CPT

## 2017-04-19 RX ORDER — VANCOMYCIN HCL 1 G
VIAL (EA) INTRAVENOUS
Qty: 0 | Refills: 0 | Status: DISCONTINUED | OUTPATIENT
Start: 2017-04-19 | End: 2017-04-19

## 2017-04-19 RX ORDER — CEFEPIME 1 G/1
2000 INJECTION, POWDER, FOR SOLUTION INTRAMUSCULAR; INTRAVENOUS EVERY 12 HOURS
Qty: 0 | Refills: 0 | Status: DISCONTINUED | OUTPATIENT
Start: 2017-04-19 | End: 2017-04-21

## 2017-04-19 RX ORDER — LEVOTHYROXINE SODIUM 125 MCG
25 TABLET ORAL DAILY
Qty: 0 | Refills: 0 | Status: DISCONTINUED | OUTPATIENT
Start: 2017-04-19 | End: 2017-04-21

## 2017-04-19 RX ORDER — CHOLECALCIFEROL (VITAMIN D3) 125 MCG
1000 CAPSULE ORAL DAILY
Qty: 0 | Refills: 0 | Status: DISCONTINUED | OUTPATIENT
Start: 2017-04-19 | End: 2017-04-21

## 2017-04-19 RX ORDER — ACETAMINOPHEN 500 MG
650 TABLET ORAL EVERY 6 HOURS
Qty: 0 | Refills: 0 | Status: DISCONTINUED | OUTPATIENT
Start: 2017-04-19 | End: 2017-04-21

## 2017-04-19 RX ORDER — METRONIDAZOLE 500 MG
500 TABLET ORAL EVERY 8 HOURS
Qty: 0 | Refills: 0 | Status: DISCONTINUED | OUTPATIENT
Start: 2017-04-19 | End: 2017-04-21

## 2017-04-19 RX ORDER — HEPARIN SODIUM 5000 [USP'U]/ML
5000 INJECTION INTRAVENOUS; SUBCUTANEOUS EVERY 12 HOURS
Qty: 0 | Refills: 0 | Status: DISCONTINUED | OUTPATIENT
Start: 2017-04-19 | End: 2017-04-21

## 2017-04-19 RX ORDER — VANCOMYCIN HCL 1 G
1000 VIAL (EA) INTRAVENOUS ONCE
Qty: 0 | Refills: 0 | Status: COMPLETED | OUTPATIENT
Start: 2017-04-19 | End: 2017-04-19

## 2017-04-19 RX ORDER — AZTREONAM 2 G
1000 VIAL (EA) INJECTION EVERY 8 HOURS
Qty: 0 | Refills: 0 | Status: DISCONTINUED | OUTPATIENT
Start: 2017-04-19 | End: 2017-04-19

## 2017-04-19 RX ORDER — IBUPROFEN 200 MG
600 TABLET ORAL ONCE
Qty: 0 | Refills: 0 | Status: COMPLETED | OUTPATIENT
Start: 2017-04-19 | End: 2017-04-19

## 2017-04-19 RX ORDER — VANCOMYCIN HCL 1 G
1000 VIAL (EA) INTRAVENOUS EVERY 12 HOURS
Qty: 0 | Refills: 0 | Status: DISCONTINUED | OUTPATIENT
Start: 2017-04-19 | End: 2017-04-19

## 2017-04-19 RX ORDER — SODIUM CHLORIDE 9 MG/ML
1000 INJECTION INTRAMUSCULAR; INTRAVENOUS; SUBCUTANEOUS
Qty: 0 | Refills: 0 | Status: DISCONTINUED | OUTPATIENT
Start: 2017-04-19 | End: 2017-04-20

## 2017-04-19 RX ADMIN — Medication 100 MILLIGRAM(S): at 22:41

## 2017-04-19 RX ADMIN — Medication 1000 UNIT(S): at 11:51

## 2017-04-19 RX ADMIN — SODIUM CHLORIDE 75 MILLILITER(S): 9 INJECTION INTRAMUSCULAR; INTRAVENOUS; SUBCUTANEOUS at 18:57

## 2017-04-19 RX ADMIN — Medication 50 MILLIGRAM(S): at 13:13

## 2017-04-19 RX ADMIN — Medication 25 MICROGRAM(S): at 05:59

## 2017-04-19 RX ADMIN — Medication 600 MILLIGRAM(S): at 00:10

## 2017-04-19 RX ADMIN — Medication 250 MILLIGRAM(S): at 02:32

## 2017-04-19 RX ADMIN — SODIUM CHLORIDE 75 MILLILITER(S): 9 INJECTION INTRAMUSCULAR; INTRAVENOUS; SUBCUTANEOUS at 02:32

## 2017-04-19 RX ADMIN — Medication 600 MILLIGRAM(S): at 00:56

## 2017-04-19 RX ADMIN — Medication 250 MILLIGRAM(S): at 17:22

## 2017-04-19 RX ADMIN — Medication 50 MILLIGRAM(S): at 05:59

## 2017-04-19 NOTE — H&P ADULT - ASSESSMENT
76 yo M with PMH of anemia, basal cell carcinoma, fibrosarcoma, hogkins lymphoma, hypothyroidism, mitral valve prolapse, prostate cancer, p/w chills    *Sepsis likely 2/2 aspiration PNA  -Vanco / aztreonem  -IVF  -Blood cx / sputum cx  -RVP negative  -Lactate    *Anemia / basal cell carcinoma / fibrosarcoma / hypothyroidism  -C/w home meds and outpatient f/u    *Periportal mass w/ h/o lymphoma and prostate cancer  -F/u outpatient w/ GI and heme/onc   -Other incidental CT findings: nodular thickening of R adrenal gland, renal lesions, hepatic lesions -> outpatient f/u, as these may be metastatic lesions    *DVT ppx  -Heparin SubQ

## 2017-04-19 NOTE — CONSULT NOTE ADULT - ASSESSMENT
78 y/o M with h/o periportal mass s/p endoscopic biopsy, anemia, basal cell carcinoma, fibrosarcoma, Hogkins lymphoma, hypothyroidism, mitral valve prolapse, prostate cancer was admitted on 4/18 for chills. Patient states he had endoscopy yesterday with biopsy of periportal mass via EUS. After he went home he suddenly felt chills and started shaking. In ED, he had a fever of 100.7F. States he has a chronic non-productive cough 2/2 post-nasal drip. Denies sore throat / runny nose. In ED, CT also demonstrated possible PNA. Denies nausea, vomiting, abdominal pain, CP, SOB, runny nose, sore throat. He received vancomycin IV and aztreonam.    1. Febrile syndrome with chills. Probable sepsis. ?source ?GI ?related to recent biopsy procedure. Possible LLL pneumonia. Allertgy to PCN.  -obtain BC x 2  -  -reason for abx use and side effects reviewed with patient; monitor BMP and vancomycin trough levels 76 y/o M with h/o periportal mass s/p endoscopic biopsy, anemia, basal cell carcinoma, fibrosarcoma, Hogkins lymphoma, hypothyroidism, mitral valve prolapse, prostate cancer was admitted on 4/18 for chills. Patient states he had endoscopy yesterday with biopsy of periportal mass via EUS. After he went home he suddenly felt chills and started shaking. In ED, he had a fever of 100.7F. States he has a chronic non-productive cough 2/2 post-nasal drip. Denies sore throat / runny nose. In ED, CT also demonstrated possible PNA. Denies nausea, vomiting, abdominal pain, CP, SOB, runny nose, sore throat. He received vancomycin IV and aztreonam.    1. Febrile syndrome with chills. Probable sepsis. ?source ?GI ?related to recent biopsy procedure. Possible LLL pneumonia. Allergy to PCN.  -obtain BC x 2  -he received vancomycin IV and aztreonam  -will change abx to cefepime 2 gm IV q12h and metronidazole 500 mg IV q8h for better abdominal and pulmonary coverage  -reason for abx use and side effects reviewed with patient; monitor BMP and vancomycin trough levels  -monitor closely in handy of PCN allergy history (rash)  -monitor temps  -f/u CBC  -supportive care  2. Other issues: anemia, basal cell carcinoma, fibrosarcoma, Hogkins lymphoma, hypothyroidism, mitral valve prolapse, prostate cancer   -care per medicine

## 2017-04-19 NOTE — CONSULT NOTE ADULT - ASSESSMENT
76 yo man with hanh-portal mass concerning for lymphoma. Additional tissue acquisition yesterday by EUS for flow cytometry.    -Fever yesterday, no periop issues. Has had chronic cough and PND. Unclear if this is related to procedure. No suggestion of aspiration during procedure.   -Continue abx  -Diet as tolerated  -F/U path.

## 2017-04-19 NOTE — CONSULT NOTE ADULT - SUBJECTIVE AND OBJECTIVE BOX
HPI:  76 yo M with PMH of anemia, basal cell carcinoma, fibrosarcoma, hogkins lymphoma, hypothyroidism, mitral valve prolapse, prostate cancer, p/w chills. Patient had EUS with FNB of periportal mass yesterday. Notes he had chronic cough, PND the week before the procedure, thought about rescheduling, but felt better, so proceeded. Now having cough and hoarse voice. Unsure if this is procedure related or not. No further fevers. CT suggestive of possible PNA  No abdominal pain, n/v. Tolerating PO fine, but generally has had decreased appetite    PSH:  H/O bilateral inguinal hernia repair  1991, H/O prostatectomy  2001, History of sarcoma  excision - 1993, S/P hernia repair    S/P prostatectomy, S/P T&A (status post tonsillectomy and adenoidectomy)  6 y/o    Social hx: Denies x 3, lives at home    Family Hx: mother - MS (19 Apr 2017 00:58)      PAST MEDICAL & SURGICAL HISTORY:  Anemia: iron deficiency  Basal cell carcinoma: face, head, neck  Sarcoma: inguinal  Prostate CA  Hodgkin disease  Enlarged lymph node  MVP (mitral valve prolapse)  Hypothyroidism  Mitral valve prolapse  Prostate CA  Hodgkin&#x27;s disease  Fibrosarcoma  H/O prostatectomy: 2001  History of sarcoma: excision - 1993  H/O bilateral inguinal hernia repair: 1991  S/P T&amp;A (status post tonsillectomy and adenoidectomy): 6 y/o  S/P hernia repair  S/P prostatectomy      MEDICATIONS  (STANDING):  levothyroxine 25MICROGram(s) Oral daily  cholecalciferol 1000Unit(s) Oral daily  heparin  Injectable 5000Unit(s) SubCutaneous every 12 hours  sodium chloride 0.9%. 1000milliLiter(s) IV Continuous <Continuous>  vancomycin  IVPB  IV Intermittent   vancomycin  IVPB 1000milliGRAM(s) IV Intermittent every 12 hours  aztreonam  IVPB 1000milliGRAM(s) IV Intermittent every 8 hours    MEDICATIONS  (PRN):  acetaminophen   Tablet 650milliGRAM(s) Oral every 6 hours PRN For Temp greater than 38 C (100.4 F)      Allergies    erythromycin (Other)  Minocin (Other)  Pecicillin  Tetracycline (Stomach Upset; Rash)  penicillin (Rash)  tetracycline (Stomach Upset)    Intolerances        SOCIAL HISTORY:  No smoking, social alcohol use, no recreational drug use    FAMILY HISTORY:  Family history of Parkinson disease (Sibling): sister  Family history of multiple sclerosis (Mother): mother  Family history of diabetes mellitus (Aunt): aunts and uncles  Family history of heart disease (Father): father      REVIEW OF SYSTEMS    General: fever as above    HEENT: no icterus    Respiratory and Thorax: as above  	  Cardiovascular: no CP    Gastrointestinal: no dysphagia, heartburn, n/v, abdominal pain, diarrhea, constipation or blood in the stool    : no dysuria    Musculoskeletal: no myalgias    Skin: no jaundice    Neuro: no headaches or dizziness    Vital Signs Last 24 Hrs  T(C): 36.7, Max: 38.4 (04-18 @ 19:15)  T(F): 98, Max: 101.2 (04-18 @ 19:15)  HR: 77 (77 - 120)  BP: 145/71 (130/62 - 176/77)  BP(mean): --  RR: 18 (18 - 20)  SpO2: 98% (94% - 98%)    PHYSICAL EXAM:    Constitutional: NAD    Head: NCAT    HEENT: anicteric    Neck: no abnormal lymphadenopathy    Cardiovascular:  RRR    Gastrointestinal: soft ND +BS NTTP    Extremities: no LE edema    Neuro: no focal deficits    Skin: no jaundice      LABS:                        10.1   12.8  )-----------( 255      ( 19 Apr 2017 05:34 )             32.6     04-19    145  |  111<H>  |  15  ----------------------------<  93  4.1   |  24  |  1.03    Ca    8.6      19 Apr 2017 05:34  Phos  2.6     04-19  Mg     2.1     04-19    TPro  7.3  /  Alb  2.1<L>  /  TBili  0.4  /  DBili  x   /  AST  18  /  ALT  16  /  AlkPhos  106  04-19    PT/INR - ( 19 Apr 2017 05:34 )   PT: 14.7 sec;   INR: 1.35 ratio         PTT - ( 18 Apr 2017 19:11 )  PTT:31.1 sec  LIVER FUNCTIONS - ( 19 Apr 2017 05:34 )  Alb: 2.1 g/dL / Pro: 7.3 gm/dL / ALK PHOS: 106 U/L / ALT: 16 U/L / AST: 18 U/L / GGT: x             RADIOLOGY & ADDITIONAL STUDIES:
Patient is a 77y old  Male who presents with a chief complaint of chills (2017 00:58)    HPI:  78 y/o M with h/o periportal mass s/p endoscopic biopsy, anemia, basal cell carcinoma, fibrosarcoma, Hogkins lymphoma, hypothyroidism, mitral valve prolapse, prostate cancer was admitted on  for chills. Patient states he had endoscopy yesterday with biopsy of periportal mass via EUS. After he went home he suddenly felt chills and started shaking. In ED, he had a fever of 100.7F. States he has a chronic non-productive cough 2/2 post-nasal drip. Denies sore throat / runny nose. In ED, CT also demonstrated possible PNA. Denies nausea, vomiting, abdominal pain, CP, SOB, runny nose, sore throat. He received vancomycin IV and aztreonam.      PMH: as above  PSH: bilateral inguinal hernia repair  , prostatectomy  , History of sarcoma  excision - , status post tonsillectomy and adenoidectomy)  8 y/o  Meds: per reconciliation sheet, noted below  MEDICATIONS  (STANDING):  levothyroxine 25MICROGram(s) Oral daily  cholecalciferol 1000Unit(s) Oral daily  heparin  Injectable 5000Unit(s) SubCutaneous every 12 hours  sodium chloride 0.9%. 1000milliLiter(s) IV Continuous <Continuous>  vancomycin  IVPB  IV Intermittent   vancomycin  IVPB 1000milliGRAM(s) IV Intermittent every 12 hours  aztreonam  IVPB 1000milliGRAM(s) IV Intermittent every 8 hours    MEDICATIONS  (PRN):  acetaminophen   Tablet 650milliGRAM(s) Oral every 6 hours PRN For Temp greater than 38 C (100.4 F)    Allergies    erythromycin (Other)  Minocin (Other)  Pencillin  Tetracycline (Stomach Upset; Rash)  penicillin (Rash)  tetracycline (Stomach Upset)    Intolerances      Social: no smoking, no alcohol, no illegal drugs; no recent travel, no exposure to TB  FAMILY HISTORY:  Family history of Parkinson disease (Sibling): sister  Family history of multiple sclerosis (Mother): mother  Family history of diabetes mellitus (Aunt): aunts and uncles  Family history of heart disease (Father): father    ROS: the patient denies HA, no dizziness, no sore throat, no blurry vision, no CP, no palpitations, no SOB, no cough, no abdominal pain, no diarrhea, no N/V, no dysuria, no leg pain, no claudication, no rash, no joint aches, no rectal pain or bleeding, no night sweats    Vital Signs Last 24 Hrs  T(C): 36.7, Max: 38.4 ( @ 19:15)  T(F): 98, Max: 101.2 ( @ 19:15)  HR: 77 (77 - 120)  BP: 145/71 (130/62 - 176/77)  BP(mean): --  RR: 18 (18 - 20)  SpO2: 98% (94% - 98%)  Daily Height in cm: 165.1 (2017 18:43)    Daily     PE:    Constitutional: frail looking  HEENT: NC/AT, EOMI, PERRLA  Neck: supple  Back: no tenderness  Respiratory: clear  Cardiovascular: S1S2 regular, no murmurs  Abdomen: soft, not tender, not distended, positive BS  Genitourinary: deferred  Rectal: deferred  Musculoskeletal: no muscle tenderness, no joint swelling or tenderness  Extremities: no pedal edema  Neurological: AxOx3, moving all extremities, no focal deficits  Skin: no rashes    Labs:                        10.1   12.8  )-----------( 255      ( 2017 05:34 )             32.6     -19    145  |  111<H>  |  15  ----------------------------<  93  4.1   |  24  |  1.03    Ca    8.6      2017 05:34  Phos  2.6     -  Mg     2.1     -    TPro  7.3  /  Alb  2.1<L>  /  TBili  0.4  /  DBili  x   /  AST  18  /  ALT  16  /  AlkPhos  106  -19     LIVER FUNCTIONS - ( 2017 05:34 )  Alb: 2.1 g/dL / Pro: 7.3 gm/dL / ALK PHOS: 106 U/L / ALT: 16 U/L / AST: 18 U/L / GGT: x           Urinalysis Basic - ( 2017 23:04 )    Color: Yellow / Appearance: Clear / S.005 / pH: x  Gluc: x / Ketone: Negative  / Bili: Negative / Urobili: Negative mg/dL   Blood: x / Protein: Negative mg/dL / Nitrite: Negative   Leuk Esterase: Negative / RBC: x / WBC x   Sq Epi: x / Non Sq Epi: x / Bacteria: x        Radiology:    CT abdomen and pelvis:  No evidence of bowel obstruction, gastrointestinal perforation or   abscess/drainable fluid collection.    Patchy opacity in the left lower lobe suspicious for pneumonia.  A   follow-up CT can be obtained in one month, after treatment to ensure   resolution the imaging findings.    Vague patchy hypoattenuation the kidneys may be related to   hypoenhancement of the medullary pyramids.  Pyelonephritis should be   excluded based on clinical symptoms and laboratory values.    Heterogeneous portacaval mass measuring 6.5 x 5.3 cm.  Correlate with   biopsy results..    Advanced directives addressed: full resuscitation

## 2017-04-19 NOTE — H&P ADULT - HISTORY OF PRESENT ILLNESS
78 yo M with PMH of anemia, basal cell carcinoma, fibrosarcoma, hogkins lymphoma, hypothyroidism, mitral valve prolapse, prostate cancer, p/w chills. Patient states he had endoscopy yesterday with biopsy of periportal mass via EUS. After he went home he suddenly felt chills and started shaking. When he came to ED, he had a fever of 100.7. States he has a chronic non-productive cough 2/2 post-nasal drip. Denies sore throat / runny nose. In ED, CT demonstrated possible PNA.     Denies nausea, vomiting, abdominal pain, CP, SOB, runny nose, sore throat.     PSH:  H/O bilateral inguinal hernia repair  1991, H/O prostatectomy  2001, History of sarcoma  excision - 1993, S/P hernia repair    S/P prostatectomy, S/P T&A (status post tonsillectomy and adenoidectomy)  6 y/o    Social hx: Denies x 3, lives at home    Family Hx: mother - MS

## 2017-04-20 LAB
CULTURE RESULTS: NO GROWTH — SIGNIFICANT CHANGE UP
HCT VFR BLD CALC: 30.8 % — LOW (ref 39–50)
HGB BLD-MCNC: 9.5 G/DL — LOW (ref 13–17)
MCHC RBC-ENTMCNC: 25.5 PG — LOW (ref 27–34)
MCHC RBC-ENTMCNC: 30.9 GM/DL — LOW (ref 32–36)
MCV RBC AUTO: 82.5 FL — SIGNIFICANT CHANGE UP (ref 80–100)
PLATELET # BLD AUTO: 242 K/UL — SIGNIFICANT CHANGE UP (ref 150–400)
RBC # BLD: 3.74 M/UL — LOW (ref 4.2–5.8)
RBC # FLD: 18.2 % — HIGH (ref 10.3–14.5)
SPECIMEN SOURCE: SIGNIFICANT CHANGE UP
WBC # BLD: 8 K/UL — SIGNIFICANT CHANGE UP (ref 3.8–10.5)
WBC # FLD AUTO: 8 K/UL — SIGNIFICANT CHANGE UP (ref 3.8–10.5)

## 2017-04-20 RX ADMIN — Medication 100 MILLIGRAM(S): at 13:35

## 2017-04-20 RX ADMIN — Medication 1000 UNIT(S): at 13:35

## 2017-04-20 RX ADMIN — Medication 100 MILLIGRAM(S): at 21:02

## 2017-04-20 RX ADMIN — SODIUM CHLORIDE 75 MILLILITER(S): 9 INJECTION INTRAMUSCULAR; INTRAVENOUS; SUBCUTANEOUS at 11:04

## 2017-04-20 RX ADMIN — Medication 100 MILLIGRAM(S): at 06:02

## 2017-04-20 RX ADMIN — CEFEPIME 100 MILLIGRAM(S): 1 INJECTION, POWDER, FOR SOLUTION INTRAMUSCULAR; INTRAVENOUS at 17:02

## 2017-04-20 RX ADMIN — CEFEPIME 100 MILLIGRAM(S): 1 INJECTION, POWDER, FOR SOLUTION INTRAMUSCULAR; INTRAVENOUS at 06:02

## 2017-04-20 RX ADMIN — Medication 25 MICROGRAM(S): at 06:02

## 2017-04-21 ENCOUNTER — TRANSCRIPTION ENCOUNTER (OUTPATIENT)
Age: 77
End: 2017-04-21

## 2017-04-21 VITALS
SYSTOLIC BLOOD PRESSURE: 153 MMHG | HEART RATE: 82 BPM | TEMPERATURE: 98 F | OXYGEN SATURATION: 97 % | DIASTOLIC BLOOD PRESSURE: 82 MMHG | RESPIRATION RATE: 13 BRPM

## 2017-04-21 LAB
HCT VFR BLD CALC: 30.8 % — LOW (ref 39–50)
HGB BLD-MCNC: 9.8 G/DL — LOW (ref 13–17)
MCHC RBC-ENTMCNC: 26.3 PG — LOW (ref 27–34)
MCHC RBC-ENTMCNC: 31.8 GM/DL — LOW (ref 32–36)
MCV RBC AUTO: 82.7 FL — SIGNIFICANT CHANGE UP (ref 80–100)
PLATELET # BLD AUTO: 260 K/UL — SIGNIFICANT CHANGE UP (ref 150–400)
RBC # BLD: 3.72 M/UL — LOW (ref 4.2–5.8)
RBC # FLD: 17.6 % — HIGH (ref 10.3–14.5)
WBC # BLD: 9 K/UL — SIGNIFICANT CHANGE UP (ref 3.8–10.5)
WBC # FLD AUTO: 9 K/UL — SIGNIFICANT CHANGE UP (ref 3.8–10.5)

## 2017-04-21 RX ORDER — MOXIFLOXACIN HYDROCHLORIDE TABLETS, 400 MG 400 MG/1
1 TABLET, FILM COATED ORAL
Qty: 7 | Refills: 0 | OUTPATIENT
Start: 2017-04-21 | End: 2017-04-28

## 2017-04-21 RX ADMIN — Medication 25 MICROGRAM(S): at 05:03

## 2017-04-21 RX ADMIN — Medication 1000 UNIT(S): at 11:31

## 2017-04-21 RX ADMIN — Medication 100 MILLIGRAM(S): at 05:37

## 2017-04-21 RX ADMIN — CEFEPIME 100 MILLIGRAM(S): 1 INJECTION, POWDER, FOR SOLUTION INTRAMUSCULAR; INTRAVENOUS at 05:03

## 2017-04-21 NOTE — PROGRESS NOTE ADULT - ASSESSMENT
78 yo M with PMH of anemia, basal cell carcinoma, fibrosarcoma, hogkins lymphoma, hypothyroidism, mitral valve prolapse, prostate cancer, p/w chills    *Sepsis  CT suspect for PNA will treat as HCAP  PEr GI note, no complications during procedure ie aspiration although microaspirations are possible  Will treat as HCAP- cover for suspected GN deana PNA and other suspected resistant organisms/anaerobic organisms  -Vanco / aztreonem  -Blood cx / sputum cx  -RVP negative  -Lactate    *Anemia / basal cell carcinoma / fibrosarcoma / hypothyroidism  -C/w home meds and outpatient f/u    *Periportal mass w/ h/o lymphoma and prostate cancer  -F/u outpatient w/ GI and heme/onc   -Other incidental CT findings: nodular thickening of R adrenal gland, renal lesions, hepatic lesions -> outpatient f/u, as these may be metastatic lesions    *DVT ppx  -Heparin SubQ
76 y/o M with h/o periportal mass s/p endoscopic biopsy, anemia, basal cell carcinoma, fibrosarcoma, Hogkins lymphoma, hypothyroidism, mitral valve prolapse, prostate cancer was admitted on 4/18 for chills. Patient states he had endoscopy yesterday with biopsy of periportal mass via EUS. After he went home he suddenly felt chills and started shaking. In ED, he had a fever of 100.7F. States he has a chronic non-productive cough 2/2 post-nasal drip. Denies sore throat / runny nose. In ED, CT also demonstrated possible PNA. Denies nausea, vomiting, abdominal pain, CP, SOB, runny nose, sore throat. He received vancomycin IV and aztreonam.    1. Febrile syndrome with chills resolved. Probable sepsis. ?source ?GI ?related to recent biopsy procedure. Possible LLL pneumonia. Allergy to PCN.  -obtain BC x 2  -s/p vancomycin IV and aztreonam  -on cefepime 2 gm IV q12h and metronidazole 500 mg IV q8h # 2  -tolerating abx well so far; no side effects noted   -may change abx to moxifloxacin 400 mg PO qd for 7 more days  -monitor temps  -f/u CBC  -supportive care  2. Other issues: anemia, basal cell carcinoma, fibrosarcoma, Hogkins lymphoma, hypothyroidism, mitral valve prolapse, prostate cancer   -care per medicine
76 y/o M with h/o periportal mass s/p endoscopic biopsy, anemia, basal cell carcinoma, fibrosarcoma, Hogkins lymphoma, hypothyroidism, mitral valve prolapse, prostate cancer was admitted on 4/18 for chills. Patient states he had endoscopy yesterday with biopsy of periportal mass via EUS. After he went home he suddenly felt chills and started shaking. In ED, he had a fever of 100.7F. States he has a chronic non-productive cough 2/2 post-nasal drip. Denies sore throat / runny nose. In ED, CT also demonstrated possible PNA. Denies nausea, vomiting, abdominal pain, CP, SOB, runny nose, sore throat. He received vancomycin IV and aztreonam.    1. Febrile syndrome with chills resolving. Probable sepsis. ?source ?GI ?related to recent biopsy procedure. Possible LLL pneumonia. Allergy to PCN.  -obtain BC x 2  -s/p vancomycin IV and aztreonam  -on cefepime 2 gm IV q12h and metronidazole 500 mg IV q8h # 1  -tolerating abx well so far; no side effects noted   -monitor closely in handy of PCN allergy history (rash)  -continue abx coverage for now  -monitor temps  -f/u CBC  -supportive care  2. Other issues: anemia, basal cell carcinoma, fibrosarcoma, Hogkins lymphoma, hypothyroidism, mitral valve prolapse, prostate cancer   -care per medicine
76 yo M with PMH of anemia, basal cell carcinoma, fibrosarcoma, hogkins lymphoma, hypothyroidism, mitral valve prolapse, prostate cancer, p/w chills    *Sepsis  CT suspect for PNA will treat as HCAP  PEr GI note, no complications during procedure ie aspiration although microaspirations are possible  Will treat as HCAP- cover for suspected GN deana PNA and other suspected resistant organisms/anaerobic organisms  -Cefepime/Flagyl   -can convert to oral abx tomorrow if remains afebrile. D/W ID  -Blood cx / sputum cx  -RVP negative  -Lactate    *Anemia / basal cell carcinoma / fibrosarcoma / hypothyroidism  -C/w home meds and outpatient f/u    *Periportal mass w/ h/o lymphoma and prostate cancer  -F/u outpatient w/ GI and heme/onc   -Other incidental CT findings: nodular thickening of R adrenal gland, renal lesions, hepatic lesions -> outpatient f/u, as these may be metastatic lesions    *DVT ppx  -Heparin SubQ
76 yo man with hanh-portal mass concerning for lymphoma. Additional tissue acquisition by EUS for flow cytometry.    -Continue abx  -Diet as tolerated  -F/U path.

## 2017-04-21 NOTE — DISCHARGE NOTE ADULT - CARE PLAN
Principal Discharge DX:	Pneumonia  Goal:	abx  Instructions for follow-up, activity and diet:	f/u with pcp

## 2017-04-21 NOTE — DISCHARGE NOTE ADULT - MEDICATION SUMMARY - MEDICATIONS TO TAKE
I will START or STAY ON the medications listed below when I get home from the hospital:    Avelox 400 mg oral tablet  -- 1 tab(s) by mouth once a day  -- Do not take dairy products, antacids, or iron preparations within one hour of this medication.  Finish all this medication unless otherwise directed by prescriber.  May cause drowsiness or dizziness.  Obtain medical advice before taking any non-prescription drugs as some may affect the action of this medication.    -- Indication: For Pna    levothyroxine 25 mcg (0.025 mg) oral tablet  -- 1 tab(s) by mouth once a day  -- Indication: For hypothyroidism    Vitamin D3 1000 intl units oral capsule  -- 1 cap(s) by mouth once a day  -- Indication: For supplement

## 2017-04-21 NOTE — DISCHARGE NOTE ADULT - PATIENT PORTAL LINK FT
“You can access the [employer] Patient Portal, offered by [facility], by registering with the following website: [email or web address]”

## 2017-04-21 NOTE — DISCHARGE NOTE ADULT - HOSPITAL COURSE
78 yo M with PMH of anemia, basal cell carcinoma, fibrosarcoma, hogkins lymphoma, hypothyroidism, mitral valve prolapse, prostate cancer, p/w chills. Patient states he had endoscopy yesterday with biopsy of periportal mass via EUS. After he went home he suddenly felt chills and started shaking. When he came to ED, he had a fever of 100.7. States he has a chronic non-productive cough 2/2 post-nasal drip. Denies sore throat / runny nose. In ED, CT demonstrated possible PNA.     Treated for several days with IV abx. Plan for 7 days avelox 400 po qd.    ICU Vital Signs Last 24 Hrs  T(C): 36.2, Max: 36.5 (04-20 @ 13:58)  T(F): 97.1, Max: 97.7 (04-20 @ 13:58)  HR: 84 (84 - 105)  BP: 140/77 (140/74 - 140/77)  BP(mean): --  ABP: --  ABP(mean): --  RR: 18 (18 - 18)  SpO2: 98% (93% - 98%)          PHYSICAL EXAM:    Constitutional: NAD, awake and alert,  HEENT: PERR, EOMI, Normal Hearing, MMM  Neck: Soft and supple, No LAD, No JVD  Respiratory: Breath sounds are clear to right lung field, Left lung with mild rales  Cardiovascular: S1 and S2, regular rate and rhythm, no Murmurs, gallops or rubs  Gastrointestinal: Bowel Sounds present, soft, nontender, nondistended, no guarding, no rebound  Extremities: No peripheral edema  Vascular: 2+ peripheral pulses  Neurological: A/O x 3, no focal deficits

## 2017-04-21 NOTE — PROGRESS NOTE ADULT - SUBJECTIVE AND OBJECTIVE BOX
HPI:    76 yo M with PMH of anemia, basal cell carcinoma, fibrosarcoma, hogkins lymphoma, hypothyroidism, mitral valve prolapse, prostate cancer, p/w chills. Patient had EUS with FNB of periportal mass yesterday. Notes he had chronic cough, PND the week before the procedure, thought about rescheduling, but felt better, so proceeded. Now having cough and hoarse voice. Unsure if this is procedure related or not. No further fevers. CT suggestive of possible PNA  No abdominal pain, n/v. Tolerating PO fine, but generally has had decreased appetite. No new events. Generally feeling better      PSH:  H/O bilateral inguinal hernia repair  1991, H/O prostatectomy  2001, History of sarcoma  excision - 1993, S/P hernia repair    S/P prostatectomy, S/P T&A (status post tonsillectomy and adenoidectomy)  8 y/o    Social hx: Denies x 3, lives at home    Family Hx: mother - MS (19 Apr 2017 00:58)      MEDICATIONS  (STANDING):  levothyroxine 25MICROGram(s) Oral daily  cholecalciferol 1000Unit(s) Oral daily  heparin  Injectable 5000Unit(s) SubCutaneous every 12 hours  cefepime  IVPB 2000milliGRAM(s) IV Intermittent every 12 hours  metroNIDAZOLE  IVPB 500milliGRAM(s) IV Intermittent every 8 hours    MEDICATIONS  (PRN):  acetaminophen   Tablet 650milliGRAM(s) Oral every 6 hours PRN For Temp greater than 38 C (100.4 F)      Allergies    erythromycin (Other)  Minocin (Other)  Pecicillin  Tetracycline (Stomach Upset; Rash)  penicillin (Rash)  tetracycline (Stomach Upset)    Intolerances        REVIEW OF SYSTEMS    General: no fever    HEENT: no icterus    Respiratory and Thorax: no SOB  	  Cardiovascular: no CP    Gastrointestinal: as above    Skin: no jaundice      Vital Signs Last 24 Hrs  T(C): 36.5, Max: 36.9 (04-20 @ 05:11)  T(F): 97.7, Max: 98.4 (04-20 @ 05:11)  HR: 105 (84 - 105)  BP: 140/74 (140/74 - 147/76)  BP(mean): --  RR: 18 (16 - 18)  SpO2: 93% (93% - 98%)    PHYSICAL EXAM:    Constitutional: NAD    HEENT: anicteric    Respiratory: CTA BL    Cardiovascular:  RRR    Gastrointestinal: soft ND +BS NTTP    Extremities: no LE edema    Neuro: no focal deficits    Skin: no jaundice      LABS:                        9.5    8.0   )-----------( 242      ( 20 Apr 2017 11:41 )             30.8     04-19    145  |  111<H>  |  15  ----------------------------<  93  4.1   |  24  |  1.03    Ca    8.6      19 Apr 2017 05:34  Phos  2.6     04-19  Mg     2.1     04-19    TPro  7.3  /  Alb  2.1<L>  /  TBili  0.4  /  DBili  x   /  AST  18  /  ALT  16  /  AlkPhos  106  04-19    PT/INR - ( 19 Apr 2017 05:34 )   PT: 14.7 sec;   INR: 1.35 ratio         PTT - ( 18 Apr 2017 19:11 )  PTT:31.1 sec  LIVER FUNCTIONS - ( 19 Apr 2017 05:34 )  Alb: 2.1 g/dL / Pro: 7.3 gm/dL / ALK PHOS: 106 U/L / ALT: 16 U/L / AST: 18 U/L / GGT: x             RADIOLOGY & ADDITIONAL STUDIES:
Patient is a 77y old  Male who presents with a chief complaint of chills (19 Apr 2017 00:58)      SUBJECTIVE:   HPI:  76 yo M with PMH of anemia, basal cell carcinoma, fibrosarcoma, hogkins lymphoma, hypothyroidism, mitral valve prolapse, prostate cancer, p/w chills. Patient states he had endoscopy yesterday with biopsy of periportal mass via EUS. After he went home he suddenly felt chills and started shaking. When he came to ED, he had a fever of 100.7. States he has a chronic non-productive cough 2/2 post-nasal drip. Denies sore throat / runny nose. In ED, CT demonstrated possible PNA.     4/20: tolerating abx well. Afebrile.     Denies nausea, vomiting, abdominal pain, CP, SOB, runny nose, sore throat.     PSH:  H/O bilateral inguinal hernia repair  1991, H/O prostatectomy  2001, History of sarcoma  excision - 1993, S/P hernia repair    S/P prostatectomy, S/P T&A (status post tonsillectomy and adenoidectomy)  8 y/o    Social hx: Denies x 3, lives at home    Family Hx: mother - MS (19 Apr 2017 00:58)            ICU Vital Signs Last 24 Hrs  T(C): 36.5, Max: 36.9 (04-20 @ 05:11)  T(F): 97.7, Max: 98.4 (04-20 @ 05:11)  HR: 105 (84 - 105)  BP: 140/74 (140/74 - 147/76)  BP(mean): --  ABP: --  ABP(mean): --  RR: 18 (16 - 18)  SpO2: 93% (93% - 98%)            I&O's Summary  I & Os for 24h ending 19 Apr 2017 07:00  =============================================  IN: 350 ml / OUT: 600 ml / NET: -250 ml    I & Os for current day (as of 19 Apr 2017 16:47)  =============================================  IN: 0 ml / OUT: 1050 ml / NET: -1050 ml      CAPILLARY BLOOD GLUCOSE      PHYSICAL EXAM:    Constitutional: NAD, awake and alert, well-developed  HEENT: PERR, EOMI, Normal Hearing, MMM  Neck: Soft and supple, No LAD, No JVD  Respiratory: Breath sounds are clear on right lung fileds, mild rales to left lung base  Cardiovascular: S1 and S2, regular rate and rhythm, no Murmurs, gallops or rubs  Gastrointestinal: Bowel Sounds present, soft, nontender, nondistended, no guarding, no rebound  Extremities: No peripheral edema  Vascular: 2+ peripheral pulses      MEDICATIONS:  MEDICATIONS  (STANDING):  levothyroxine 25MICROGram(s) Oral daily  cholecalciferol 1000Unit(s) Oral daily  heparin  Injectable 5000Unit(s) SubCutaneous every 12 hours  sodium chloride 0.9%. 1000milliLiter(s) IV Continuous <Continuous>  vancomycin  IVPB  IV Intermittent   vancomycin  IVPB 1000milliGRAM(s) IV Intermittent every 12 hours  aztreonam  IVPB 1000milliGRAM(s) IV Intermittent every 8 hours      LABS: All Labs Reviewed:                        10.1   12.8  )-----------( 255      ( 19 Apr 2017 05:34 )             32.6     04-19    145  |  111<H>  |  15  ----------------------------<  93  4.1   |  24  |  1.03    Ca    8.6      19 Apr 2017 05:34  Phos  2.6     04-19  Mg     2.1     04-19    TPro  7.3  /  Alb  2.1<L>  /  TBili  0.4  /  DBili  x   /  AST  18  /  ALT  16  /  AlkPhos  106  04-19    PT/INR - ( 19 Apr 2017 05:34 )   PT: 14.7 sec;   INR: 1.35 ratio         PTT - ( 18 Apr 2017 19:11 )  PTT:31.1 sec          Blood Culture:     RADIOLOGY/EKG:  CXR:  Patchy lingular and left lower lobe pneumonia. Given recent   procedure, consider aspiration.           CTAP with IV contrast:     No evidence of bowel obstruction, gastrointestinal perforation or   abscess/drainable fluid collection.    Patchy opacity in the left lower lobe suspicious for pneumonia.  A   follow-up CT can be obtained in one month, after treatment to ensure   resolution the imaging findings.    Vague patchy hypoattenuation the kidneys may be related to   hypoenhancement of the medullary pyramids.  Pyelonephritis should be   excluded based on clinical symptoms and laboratory values.    Heterogeneous portacaval mass measuring 6.5 x 5.3 cm.  Correlate with   biopsy results..
Patient is a 77y old  Male who presents with a chief complaint of chills (2017 00:58)    HPI:  76 y/o M with h/o periportal mass s/p endoscopic biopsy, anemia, basal cell carcinoma, fibrosarcoma, Hogkins lymphoma, hypothyroidism, mitral valve prolapse, prostate cancer was admitted on  for chills. Patient states he had endoscopy yesterday with biopsy of periportal mass via EUS. After he went home he suddenly felt chills and started shaking. In ED, he had a fever of 100.7F. States he has a chronic non-productive cough 2/2 post-nasal drip. Denies sore throat / runny nose. In ED, CT also demonstrated possible PNA. Denies nausea, vomiting, abdominal pain, CP, SOB, runny nose, sore throat. He received vancomycin IV and aztreonam.    Fever is down  No further chills  No side effects or rashes  Denies abdominal pain    MEDICATIONS  (STANDING):  levothyroxine 25MICROGram(s) Oral daily  cholecalciferol 1000Unit(s) Oral daily  heparin  Injectable 5000Unit(s) SubCutaneous every 12 hours  sodium chloride 0.9%. 1000milliLiter(s) IV Continuous <Continuous>  cefepime  IVPB 2000milliGRAM(s) IV Intermittent every 12 hours  metroNIDAZOLE  IVPB 500milliGRAM(s) IV Intermittent every 8 hours    MEDICATIONS  (PRN):  acetaminophen   Tablet 650milliGRAM(s) Oral every 6 hours PRN For Temp greater than 38 C (100.4 F)      Vital Signs Last 24 Hrs  T(C): 36.9, Max: 36.9 (- @ 05:11)  T(F): 98.4, Max: 98.4 (- @ 05:11)  HR: 84 (77 - 89)  BP: 146/70 (145/71 - 147/76)  BP(mean): --  RR: 16 (16 - 18)  SpO2: 97% (97% - 98%)    Physical Exam:    Constitutional: frail looking  HEENT: NC/AT, EOMI, PERRLA  Neck: supple  Back: no tenderness  Respiratory: clear  Cardiovascular: S1S2 regular, no murmurs  Abdomen: soft, not tender, not distended, positive BS  Genitourinary: deferred  Rectal: deferred  Musculoskeletal: no muscle tenderness, no joint swelling or tenderness  Extremities: no pedal edema  Neurological: AxOx3, moving all extremities, no focal deficits  Skin: no rashes    Labs:                        10.1   12.8  )-----------( 255      ( 2017 05:34 )             32.6     -19    145  |  111<H>  |  15  ----------------------------<  93  4.1   |  24  |  1.03    Ca    8.6      2017 05:34  Phos  2.6       Mg     2.1         TPro  7.3  /  Alb  2.1<L>  /  TBili  0.4  /  DBili  x   /  AST  18  /  ALT  16  /  AlkPhos  106       LIVER FUNCTIONS - ( 2017 05:34 )  Alb: 2.1 g/dL / Pro: 7.3 gm/dL / ALK PHOS: 106 U/L / ALT: 16 U/L / AST: 18 U/L / GGT: x           Urinalysis Basic - ( 2017 23:04 )    Color: Yellow / Appearance: Clear / S.005 / pH: x  Gluc: x / Ketone: Negative  / Bili: Negative / Urobili: Negative mg/dL   Blood: x / Protein: Negative mg/dL / Nitrite: Negative   Leuk Esterase: Negative / RBC: x / WBC x   Sq Epi: x / Non Sq Epi: x / Bacteria: x    Culture - Blood (17 @ 19:11)    Specimen Source: .Blood None    Culture Results:   No growth to date.    Radiology:    CT abdomen and pelvis:  No evidence of bowel obstruction, gastrointestinal perforation or   abscess/drainable fluid collection.    Patchy opacity in the left lower lobe suspicious for pneumonia.  A   follow-up CT can be obtained in one month, after treatment to ensure   resolution the imaging findings.    Vague patchy hypoattenuation the kidneys may be related to   hypoenhancement of the medullary pyramids.  Pyelonephritis should be   excluded based on clinical symptoms and laboratory values.    Heterogeneous portacaval mass measuring 6.5 x 5.3 cm.  Correlate with   biopsy results..    Advanced directives addressed: full resuscitation
Patient is a 77y old  Male who presents with a chief complaint of chills (2017 00:58)    HPI:  78 y/o M with h/o periportal mass s/p endoscopic biopsy, anemia, basal cell carcinoma, fibrosarcoma, Hogkins lymphoma, hypothyroidism, mitral valve prolapse, prostate cancer was admitted on  for chills. Patient states he had endoscopy yesterday with biopsy of periportal mass via EUS. After he went home he suddenly felt chills and started shaking. In ED, he had a fever of 100.7F. States he has a chronic non-productive cough 2/2 post-nasal drip. Denies sore throat / runny nose. In ED, CT also demonstrated possible PNA. Denies nausea, vomiting, abdominal pain, CP, SOB, runny nose, sore throat. He received vancomycin IV and aztreonam.    No fever or chills  No side effects or rashes  Denies abdominal pain    MEDICATIONS  (STANDING):  levothyroxine 25MICROGram(s) Oral daily  cholecalciferol 1000Unit(s) Oral daily  heparin  Injectable 5000Unit(s) SubCutaneous every 12 hours  cefepime  IVPB 2000milliGRAM(s) IV Intermittent every 12 hours  metroNIDAZOLE  IVPB 500milliGRAM(s) IV Intermittent every 8 hours    MEDICATIONS  (PRN):  acetaminophen   Tablet 650milliGRAM(s) Oral every 6 hours PRN For Temp greater than 38 C (100.4 F)      Vital Signs Last 24 Hrs  T(C): 36.2, Max: 36.5 (04-20 @ 13:58)  T(F): 97.1, Max: 97.7 (04-20 @ 13:58)  HR: 84 (84 - 105)  BP: 140/77 (140/74 - 140/77)  BP(mean): --  RR: 18 (18 - 18)  SpO2: 98% (93% - 98%)    Physical Exam:    Constitutional: frail looking  HEENT: NC/AT, EOMI, PERRLA  Neck: supple  Back: no tenderness  Respiratory: clear  Cardiovascular: S1S2 regular, no murmurs  Abdomen: soft, not tender, not distended, positive BS  Genitourinary: deferred  Rectal: deferred  Musculoskeletal: no muscle tenderness, no joint swelling or tenderness  Extremities: no pedal edema  Neurological: AxOx3, moving all extremities, no focal deficits  Skin: no rashes    Labs:                        9.8    9.0   )-----------( 260      ( 2017 06:48 )                                    10.1   12.8  )-----------( 255      ( 2017 05:34 )             32.6     -    145  |  111<H>  |  15  ----------------------------<  93  4.1   |  24  |  1.03    Ca    8.6      2017 05:34  Phos  2.6       Mg     2.1         TPro  7.3  /  Alb  2.1<L>  /  TBili  0.4  /  DBili  x   /  AST  18  /  ALT  16  /  AlkPhos  106  19     LIVER FUNCTIONS - ( 2017 05:34 )  Alb: 2.1 g/dL / Pro: 7.3 gm/dL / ALK PHOS: 106 U/L / ALT: 16 U/L / AST: 18 U/L / GGT: x           Urinalysis Basic - ( 2017 23:04 )    Color: Yellow / Appearance: Clear / S.005 / pH: x  Gluc: x / Ketone: Negative  / Bili: Negative / Urobili: Negative mg/dL   Blood: x / Protein: Negative mg/dL / Nitrite: Negative   Leuk Esterase: Negative / RBC: x / WBC x   Sq Epi: x / Non Sq Epi: x / Bacteria: x    Culture - Blood (17 @ 19:11)    Specimen Source: .Blood None    Culture Results:   No growth to date.    Radiology:    CT abdomen and pelvis:  No evidence of bowel obstruction, gastrointestinal perforation or   abscess/drainable fluid collection.    Patchy opacity in the left lower lobe suspicious for pneumonia.  A   follow-up CT can be obtained in one month, after treatment to ensure   resolution the imaging findings.    Vague patchy hypoattenuation the kidneys may be related to   hypoenhancement of the medullary pyramids.  Pyelonephritis should be   excluded based on clinical symptoms and laboratory values.    Heterogeneous portacaval mass measuring 6.5 x 5.3 cm.  Correlate with   biopsy results..    Advanced directives addressed: full resuscitation
Patient is a 77y old  Male who presents with a chief complaint of chills (19 Apr 2017 00:58)      SUBJECTIVE:   HPI:  78 yo M with PMH of anemia, basal cell carcinoma, fibrosarcoma, hogkins lymphoma, hypothyroidism, mitral valve prolapse, prostate cancer, p/w chills. Patient states he had endoscopy yesterday with biopsy of periportal mass via EUS. After he went home he suddenly felt chills and started shaking. When he came to ED, he had a fever of 100.7. States he has a chronic non-productive cough 2/2 post-nasal drip. Denies sore throat / runny nose. In ED, CT demonstrated possible PNA.     4/19:     Denies nausea, vomiting, abdominal pain, CP, SOB, runny nose, sore throat.     PSH:  H/O bilateral inguinal hernia repair  1991, H/O prostatectomy  2001, History of sarcoma  excision - 1993, S/P hernia repair    S/P prostatectomy, S/P T&A (status post tonsillectomy and adenoidectomy)  8 y/o    Social hx: Denies x 3, lives at home    Family Hx: mother - MS (19 Apr 2017 00:58)        REVIEW OF SYSTEMS:    CONSTITUTIONAL: No weakness, fevers or chills  EYES/ENT: No visual changes;  No vertigo or throat pain   NECK: No pain or stiffness  RESPIRATORY: No cough, wheezing, hemoptysis; No shortness of breath  CARDIOVASCULAR: No chest pain or palpitations  GASTROINTESTINAL: No abdominal or epigastric pain. No nausea, vomiting, or hematemesis; No diarrhea or constipation. No melena or hematochezia.  GENITOURINARY: No dysuria, frequency or hematuria  NEUROLOGICAL: No numbness or weakness  SKIN: No itching, burning, rashes, or lesions   All other review of systems is negative unless indicated above    ICU Vital Signs Last 24 Hrs  T(C): 36.7, Max: 38.4 (04-18 @ 19:15)  T(F): 98, Max: 101.2 (04-18 @ 19:15)  HR: 77 (77 - 120)  BP: 145/71 (130/62 - 176/77)  BP(mean): --  ABP: --  ABP(mean): --  RR: 18 (18 - 20)  SpO2: 98% (94% - 98%)          I&O's Summary  I & Os for 24h ending 19 Apr 2017 07:00  =============================================  IN: 350 ml / OUT: 600 ml / NET: -250 ml    I & Os for current day (as of 19 Apr 2017 16:47)  =============================================  IN: 0 ml / OUT: 1050 ml / NET: -1050 ml      CAPILLARY BLOOD GLUCOSE      PHYSICAL EXAM:    Constitutional: NAD, awake and alert, well-developed  HEENT: PERR, EOMI, Normal Hearing, MMM  Neck: Soft and supple, No LAD, No JVD  Respiratory: Breath sounds are clear bilaterally, No wheezing, rales or rhonchi  Cardiovascular: S1 and S2, regular rate and rhythm, no Murmurs, gallops or rubs  Gastrointestinal: Bowel Sounds present, soft, nontender, nondistended, no guarding, no rebound  Extremities: No peripheral edema  Vascular: 2+ peripheral pulses  Neurological: A/O x 3, no focal deficits  Musculoskeletal: 5/5 strength b/l upper and lower extremities  Skin: No rashes    MEDICATIONS:  MEDICATIONS  (STANDING):  levothyroxine 25MICROGram(s) Oral daily  cholecalciferol 1000Unit(s) Oral daily  heparin  Injectable 5000Unit(s) SubCutaneous every 12 hours  sodium chloride 0.9%. 1000milliLiter(s) IV Continuous <Continuous>  vancomycin  IVPB  IV Intermittent   vancomycin  IVPB 1000milliGRAM(s) IV Intermittent every 12 hours  aztreonam  IVPB 1000milliGRAM(s) IV Intermittent every 8 hours      LABS: All Labs Reviewed:                        10.1   12.8  )-----------( 255      ( 19 Apr 2017 05:34 )             32.6     04-19    145  |  111<H>  |  15  ----------------------------<  93  4.1   |  24  |  1.03    Ca    8.6      19 Apr 2017 05:34  Phos  2.6     04-19  Mg     2.1     04-19    TPro  7.3  /  Alb  2.1<L>  /  TBili  0.4  /  DBili  x   /  AST  18  /  ALT  16  /  AlkPhos  106  04-19    PT/INR - ( 19 Apr 2017 05:34 )   PT: 14.7 sec;   INR: 1.35 ratio         PTT - ( 18 Apr 2017 19:11 )  PTT:31.1 sec          Blood Culture:     RADIOLOGY/EKG:  CXR:  Patchy lingular and left lower lobe pneumonia. Given recent   procedure, consider aspiration.           CTAP with IV contrast:     No evidence of bowel obstruction, gastrointestinal perforation or   abscess/drainable fluid collection.    Patchy opacity in the left lower lobe suspicious for pneumonia.  A   follow-up CT can be obtained in one month, after treatment to ensure   resolution the imaging findings.    Vague patchy hypoattenuation the kidneys may be related to   hypoenhancement of the medullary pyramids.  Pyelonephritis should be   excluded based on clinical symptoms and laboratory values.    Heterogeneous portacaval mass measuring 6.5 x 5.3 cm.  Correlate with   biopsy results..

## 2017-04-24 LAB
CULTURE RESULTS: SIGNIFICANT CHANGE UP
CULTURE RESULTS: SIGNIFICANT CHANGE UP
SPECIMEN SOURCE: SIGNIFICANT CHANGE UP
SPECIMEN SOURCE: SIGNIFICANT CHANGE UP
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
## 2017-04-25 DIAGNOSIS — K76.9 LIVER DISEASE, UNSPECIFIED: ICD-10-CM

## 2017-04-25 DIAGNOSIS — Z88.1 ALLERGY STATUS TO OTHER ANTIBIOTIC AGENTS STATUS: ICD-10-CM

## 2017-04-25 DIAGNOSIS — J15.6 PNEUMONIA DUE TO OTHER GRAM-NEGATIVE BACTERIA: ICD-10-CM

## 2017-04-25 DIAGNOSIS — E03.9 HYPOTHYROIDISM, UNSPECIFIED: ICD-10-CM

## 2017-04-25 DIAGNOSIS — Z85.46 PERSONAL HISTORY OF MALIGNANT NEOPLASM OF PROSTATE: ICD-10-CM

## 2017-04-25 DIAGNOSIS — I34.1 NONRHEUMATIC MITRAL (VALVE) PROLAPSE: ICD-10-CM

## 2017-04-25 DIAGNOSIS — C81.90 HODGKIN LYMPHOMA, UNSPECIFIED, UNSPECIFIED SITE: ICD-10-CM
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
## 2017-04-25 DIAGNOSIS — Z85.828 PERSONAL HISTORY OF OTHER MALIGNANT NEOPLASM OF SKIN: ICD-10-CM

## 2017-04-25 DIAGNOSIS — R50.9 FEVER, UNSPECIFIED: ICD-10-CM

## 2017-04-25 DIAGNOSIS — J95.4 CHEMICAL PNEUMONITIS DUE TO ANESTHESIA: ICD-10-CM
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
## 2017-04-25 DIAGNOSIS — D50.9 IRON DEFICIENCY ANEMIA, UNSPECIFIED: ICD-10-CM

## 2017-04-25 DIAGNOSIS — N28.9 DISORDER OF KIDNEY AND URETER, UNSPECIFIED: ICD-10-CM

## 2017-04-25 DIAGNOSIS — D49.89 NEOPLASM OF UNSPECIFIED BEHAVIOR OF OTHER SPECIFIED SITES: ICD-10-CM

## 2017-04-25 DIAGNOSIS — Z90.79 ACQUIRED ABSENCE OF OTHER GENITAL ORGAN(S): ICD-10-CM

## 2017-04-25 DIAGNOSIS — Z88.0 ALLERGY STATUS TO PENICILLIN: ICD-10-CM

## 2017-04-25 DIAGNOSIS — A41.9 SEPSIS, UNSPECIFIED ORGANISM: ICD-10-CM

## 2017-04-25 DIAGNOSIS — Z85.89 PERSONAL HISTORY OF MALIGNANT NEOPLASM OF OTHER ORGANS AND SYSTEMS: ICD-10-CM

## 2017-04-25 LAB — SURGICAL PATHOLOGY FINAL REPORT - CH: SIGNIFICANT CHANGE UP

## 2017-04-26 DIAGNOSIS — R93.3 ABNORMAL FINDINGS ON DIAGNOSTIC IMAGING OF OTHER PARTS OF DIGESTIVE TRACT: ICD-10-CM

## 2017-04-26 DIAGNOSIS — Z88.1 ALLERGY STATUS TO OTHER ANTIBIOTIC AGENTS STATUS: ICD-10-CM

## 2017-04-26 DIAGNOSIS — Z86.010 PERSONAL HISTORY OF COLONIC POLYPS: ICD-10-CM

## 2017-04-26 DIAGNOSIS — Z92.21 PERSONAL HISTORY OF ANTINEOPLASTIC CHEMOTHERAPY: ICD-10-CM

## 2017-04-26 DIAGNOSIS — M19.90 UNSPECIFIED OSTEOARTHRITIS, UNSPECIFIED SITE: ICD-10-CM

## 2017-04-26 DIAGNOSIS — Z88.0 ALLERGY STATUS TO PENICILLIN: ICD-10-CM

## 2017-04-26 DIAGNOSIS — R01.1 CARDIAC MURMUR, UNSPECIFIED: ICD-10-CM

## 2017-04-26 DIAGNOSIS — C85.93 NON-HODGKIN LYMPHOMA, UNSPECIFIED, INTRA-ABDOMINAL LYMPH NODES: ICD-10-CM

## 2017-04-26 DIAGNOSIS — Z85.71 PERSONAL HISTORY OF HODGKIN LYMPHOMA: ICD-10-CM

## 2017-04-26 DIAGNOSIS — E03.9 HYPOTHYROIDISM, UNSPECIFIED: ICD-10-CM

## 2017-04-26 DIAGNOSIS — D50.9 IRON DEFICIENCY ANEMIA, UNSPECIFIED: ICD-10-CM

## 2017-04-26 DIAGNOSIS — Z85.828 PERSONAL HISTORY OF OTHER MALIGNANT NEOPLASM OF SKIN: ICD-10-CM

## 2017-04-27 ENCOUNTER — MESSAGE (OUTPATIENT)
Age: 77
End: 2017-04-27

## 2017-05-04 ENCOUNTER — APPOINTMENT (OUTPATIENT)
Dept: SURGICAL ONCOLOGY | Facility: CLINIC | Age: 77
End: 2017-05-04

## 2017-05-04 VITALS
WEIGHT: 148 LBS | HEART RATE: 97 BPM | DIASTOLIC BLOOD PRESSURE: 79 MMHG | SYSTOLIC BLOOD PRESSURE: 135 MMHG | BODY MASS INDEX: 24.66 KG/M2 | HEIGHT: 65 IN

## 2017-05-04 DIAGNOSIS — R19.00 INTRA-ABDOMINAL AND PELVIC SWELLING, MASS AND LUMP, UNSPECIFIED SITE: ICD-10-CM

## 2017-05-11 ENCOUNTER — OUTPATIENT (OUTPATIENT)
Dept: OUTPATIENT SERVICES | Facility: HOSPITAL | Age: 77
LOS: 1 days | End: 2017-05-11
Payer: MEDICARE

## 2017-05-11 VITALS
WEIGHT: 147.05 LBS | RESPIRATION RATE: 13 BRPM | OXYGEN SATURATION: 97 % | TEMPERATURE: 99 F | HEART RATE: 85 BPM | HEIGHT: 65 IN | DIASTOLIC BLOOD PRESSURE: 70 MMHG | SYSTOLIC BLOOD PRESSURE: 120 MMHG

## 2017-05-11 DIAGNOSIS — Z98.890 OTHER SPECIFIED POSTPROCEDURAL STATES: Chronic | ICD-10-CM

## 2017-05-11 DIAGNOSIS — R19.00 INTRA-ABDOMINAL AND PELVIC SWELLING, MASS AND LUMP, UNSPECIFIED SITE: ICD-10-CM

## 2017-05-11 DIAGNOSIS — R19.09 OTHER INTRA-ABDOMINAL AND PELVIC SWELLING, MASS AND LUMP: ICD-10-CM

## 2017-05-11 DIAGNOSIS — Z90.89 ACQUIRED ABSENCE OF OTHER ORGANS: Chronic | ICD-10-CM

## 2017-05-11 DIAGNOSIS — Z85.831 PERSONAL HISTORY OF MALIGNANT NEOPLASM OF SOFT TISSUE: Chronic | ICD-10-CM

## 2017-05-11 DIAGNOSIS — I34.1 NONRHEUMATIC MITRAL (VALVE) PROLAPSE: ICD-10-CM

## 2017-05-11 DIAGNOSIS — G47.33 OBSTRUCTIVE SLEEP APNEA (ADULT) (PEDIATRIC): ICD-10-CM

## 2017-05-11 DIAGNOSIS — Z90.79 ACQUIRED ABSENCE OF OTHER GENITAL ORGAN(S): Chronic | ICD-10-CM

## 2017-05-11 DIAGNOSIS — E03.9 HYPOTHYROIDISM, UNSPECIFIED: ICD-10-CM

## 2017-05-11 DIAGNOSIS — T78.40XA ALLERGY, UNSPECIFIED, INITIAL ENCOUNTER: ICD-10-CM

## 2017-05-11 LAB
ALBUMIN SERPL ELPH-MCNC: 3.3 G/DL — SIGNIFICANT CHANGE UP (ref 3.3–5)
ALP SERPL-CCNC: 140 U/L — HIGH (ref 40–120)
ALT FLD-CCNC: 22 U/L — SIGNIFICANT CHANGE UP (ref 4–41)
AST SERPL-CCNC: 18 U/L — SIGNIFICANT CHANGE UP (ref 4–40)
BILIRUB SERPL-MCNC: 0.2 MG/DL — SIGNIFICANT CHANGE UP (ref 0.2–1.2)
BLD GP AB SCN SERPL QL: NEGATIVE — SIGNIFICANT CHANGE UP
BUN SERPL-MCNC: 21 MG/DL — SIGNIFICANT CHANGE UP (ref 7–23)
CALCIUM SERPL-MCNC: 9.7 MG/DL — SIGNIFICANT CHANGE UP (ref 8.4–10.5)
CHLORIDE SERPL-SCNC: 101 MMOL/L — SIGNIFICANT CHANGE UP (ref 98–107)
CO2 SERPL-SCNC: 26 MMOL/L — SIGNIFICANT CHANGE UP (ref 22–31)
CREAT SERPL-MCNC: 1.19 MG/DL — SIGNIFICANT CHANGE UP (ref 0.5–1.3)
GLUCOSE SERPL-MCNC: 69 MG/DL — LOW (ref 70–99)
HCT VFR BLD CALC: 34.2 % — LOW (ref 39–50)
HGB BLD-MCNC: 10.5 G/DL — LOW (ref 13–17)
MCHC RBC-ENTMCNC: 26.7 PG — LOW (ref 27–34)
MCHC RBC-ENTMCNC: 30.7 % — LOW (ref 32–36)
MCV RBC AUTO: 87 FL — SIGNIFICANT CHANGE UP (ref 80–100)
PLATELET # BLD AUTO: 335 K/UL — SIGNIFICANT CHANGE UP (ref 150–400)
PMV BLD: 10.2 FL — SIGNIFICANT CHANGE UP (ref 7–13)
POTASSIUM SERPL-MCNC: 4.6 MMOL/L — SIGNIFICANT CHANGE UP (ref 3.5–5.3)
POTASSIUM SERPL-SCNC: 4.6 MMOL/L — SIGNIFICANT CHANGE UP (ref 3.5–5.3)
PROT SERPL-MCNC: 8.1 G/DL — SIGNIFICANT CHANGE UP (ref 6–8.3)
RBC # BLD: 3.93 M/UL — LOW (ref 4.2–5.8)
RBC # FLD: 17.8 % — HIGH (ref 10.3–14.5)
RH IG SCN BLD-IMP: NEGATIVE — SIGNIFICANT CHANGE UP
SODIUM SERPL-SCNC: 142 MMOL/L — SIGNIFICANT CHANGE UP (ref 135–145)
TSH SERPL-MCNC: 2.53 UIU/ML — SIGNIFICANT CHANGE UP (ref 0.27–4.2)
WBC # BLD: 8.26 K/UL — SIGNIFICANT CHANGE UP (ref 3.8–10.5)
WBC # FLD AUTO: 8.26 K/UL — SIGNIFICANT CHANGE UP (ref 3.8–10.5)

## 2017-05-11 PROCEDURE — 93010 ELECTROCARDIOGRAM REPORT: CPT

## 2017-05-11 NOTE — H&P PST ADULT - NEGATIVE GASTROINTESTINAL SYMPTOMS
no diarrhea/no abdominal pain/no vomiting/no constipation/no hematochezia/no change in bowel habits/no nausea/no melena/no flatulence

## 2017-05-11 NOTE — H&P PST ADULT - NEGATIVE GENERAL SYMPTOMS
no sweating/no chills/no polydipsia/no fever/no weight loss/no weight gain/no polyuria/no malaise/no polyphagia/no anorexia/no fatigue

## 2017-05-11 NOTE — H&P PST ADULT - NEGATIVE MUSCULOSKELETAL SYMPTOMS
no leg pain R/no myalgia/no muscle cramps/no joint swelling/no arm pain L/no arm pain R/no leg pain L/no muscle weakness/no neck pain

## 2017-05-11 NOTE — H&P PST ADULT - PROBLEM SELECTOR PLAN 3
Pt has Hx of MVP. + Murmur noted on assessment. Last echo done 30 years ago as per pt. never re evaluated. Pt had Chemotherapy after last echo as well. Has no cardiologist. Cardiac clearance requested, Dr Moreno information provided and able to make appt for pt while at [facility] for today at 4 pm. Clearance form provided.

## 2017-05-11 NOTE — H&P PST ADULT - PROBLEM SELECTOR PLAN 1
Pt is scheduled for a laparoscopic, possible open sampling of periportal mass on 5/24/17. Preop instructions provided including Pepcid, Hibiclens, NPO status and meds to be taken and held. Verbalized understanding. Pt states has medical clearance appointment and PCP is pending on lab work from PST. Form provided. Pt is scheduled for a laparoscopic, possible open sampling of periportal mass on 5/24/17. Preop instructions provided including Pepcid, Hibiclens, NPO status and meds to be taken and held. Verbalized understanding. Pt states has medical clearance appointment and PCP is pending on lab work from [facility]. Form provided.  Pt stated Delayed awakening w/ anesthesia, team aware.

## 2017-05-11 NOTE — H&P PST ADULT - NEGATIVE GENERAL GENITOURINARY SYMPTOMS
no flank pain R/no bladder infections/no flank pain L/no hematuria/no urine discoloration/no incontinence/no urinary hesitancy/no renal colic/no nocturia/normal libido/no dysuria

## 2017-05-11 NOTE — H&P PST ADULT - NEGATIVE ALLERGY TYPES
no outdoor environmental allergies/no reactions to insect bites/no indoor environmental allergies/no reactions to food/no reactions to animals

## 2017-05-11 NOTE — H&P PST ADULT - ACTIVITY
ADL's, GYM, walks. SOB at times but told due to anemia when climbing stairs. ADL's, GYM, walks. SOB at times but states to be due to anemia when climbing stairs.

## 2017-05-11 NOTE — H&P PST ADULT - ATTENDING COMMENTS
d/w pt and family plan for lap sampling of periportal mass.  discussed r/b/a post op expecta, poss complic.  They understand and agree to proceed. d/w pt and family plan for lap sampling of periportal mass.  discussed r/b/a post op expecta, poss complic.  They understand and agree to proceed.    PMH/PSH/FH/SH:   Past Medical History:  Anemia  iron deficiency  Basal cell carcinoma  face, head, neck  Enlarged lymph node    Fibrosarcoma    Hodgkin disease    Hodgkin's disease    Hypothyroidism    Mitral valve prolapse    MVP (mitral valve prolapse)    Prostate CA    Prostate CA    Sarcoma  inguinal.    Past Surgical History:  H/O bilateral inguinal hernia repair  1991  H/O prostatectomy  2001  History of sarcoma  excision - 1993  S/P hernia repair    S/P prostatectomy    S/P T&A (status post tonsillectomy and adenoidectomy)  8 y/o.

## 2017-05-11 NOTE — H&P PST ADULT - GASTROINTESTINAL DETAILS
no rigidity/bowel sounds normal/nontender/no organomegaly/no guarding/no masses palpable/no distention/no rebound tenderness/no bruit/soft

## 2017-05-11 NOTE — H&P PST ADULT - PROBLEM SELECTOR PLAN 2
Pt instructed to continue levothyroxine as ordered. TSH sent since pt stated last checked on 12/2016. Pt instructed to take in am of sx with a sip of water. Pt stated will hold.

## 2017-05-11 NOTE — H&P PST ADULT - NEGATIVE ENMT SYMPTOMS
no hearing difficulty/no nasal discharge/no recurrent cold sores/no ear pain/no nose bleeds/no vertigo/no sinus symptoms/no abnormal taste sensation/no dry mouth/no dysphagia/no nasal obstruction/no nasal congestion/no gum bleeding/no throat pain/no tinnitus

## 2017-05-11 NOTE — H&P PST ADULT - NEGATIVE BREAST SYMPTOMS
no breast tenderness L/no breast lump R/no nipple discharge L/no nipple discharge R/no breast tenderness R/no breast lump L

## 2017-05-11 NOTE — H&P PST ADULT - NEGATIVE NEUROLOGICAL SYMPTOMS
no vertigo/no loss of consciousness/no confusion/no headache/no facial palsy/no focal seizures/no syncope/no tremors/no loss of sensation/no transient paralysis/no generalized seizures/no weakness/no difficulty walking/no hemiparesis/no paresthesias

## 2017-05-11 NOTE — H&P PST ADULT - ENDOCRINE COMMENTS
Recently diagnosed with hypothyroidism. Pt states was Diagnosed with hypothyroidism last December and given synthroid 25 mcg. TSH not re evaluated yet. will ordered

## 2017-05-11 NOTE — H&P PST ADULT - NEGATIVE SKIN SYMPTOMS
no itching/no brittle nails/no hair loss/no rash/no dryness/no pitted nails/no change in size/color of mole/no tumor

## 2017-05-11 NOTE — H&P PST ADULT - NEGATIVE CARDIOVASCULAR SYMPTOMS
no paroxysmal nocturnal dyspnea/no peripheral edema/no chest pain/no palpitations/no orthopnea/no claudication/no dyspnea on exertion

## 2017-05-11 NOTE — H&P PST ADULT - NEGATIVE PSYCHIATRIC SYMPTOMS
no insomnia/no hyperactivity/no depression/no mood swings/no memory loss/no visual hallucinations/no suicidal ideation/no anxiety/no agitation/no paranoia/no auditory hallucinations

## 2017-05-11 NOTE — H&P PST ADULT - RS GEN PE MLT RESP DETAILS PC
breath sounds equal/respirations non-labored/no rales/clear to auscultation bilaterally/no subcutaneous emphysema/no wheezes/airway patent/no chest wall tenderness/no rhonchi/good air movement

## 2017-05-24 ENCOUNTER — RESULT REVIEW (OUTPATIENT)
Age: 77
End: 2017-05-24

## 2017-05-24 ENCOUNTER — TRANSCRIPTION ENCOUNTER (OUTPATIENT)
Age: 77
End: 2017-05-24

## 2017-05-24 ENCOUNTER — INPATIENT (INPATIENT)
Facility: HOSPITAL | Age: 77
LOS: 0 days | Discharge: HOME | End: 2017-05-25
Attending: SURGERY | Admitting: SURGERY
Payer: MEDICARE

## 2017-05-24 ENCOUNTER — APPOINTMENT (OUTPATIENT)
Dept: SURGICAL ONCOLOGY | Facility: HOSPITAL | Age: 77
End: 2017-05-24

## 2017-05-24 VITALS
OXYGEN SATURATION: 96 % | RESPIRATION RATE: 16 BRPM | SYSTOLIC BLOOD PRESSURE: 121 MMHG | TEMPERATURE: 98 F | HEART RATE: 98 BPM | DIASTOLIC BLOOD PRESSURE: 72 MMHG | HEIGHT: 65 IN | WEIGHT: 147.05 LBS

## 2017-05-24 DIAGNOSIS — Z90.79 ACQUIRED ABSENCE OF OTHER GENITAL ORGAN(S): Chronic | ICD-10-CM

## 2017-05-24 DIAGNOSIS — Z90.89 ACQUIRED ABSENCE OF OTHER ORGANS: Chronic | ICD-10-CM

## 2017-05-24 DIAGNOSIS — Z98.890 OTHER SPECIFIED POSTPROCEDURAL STATES: Chronic | ICD-10-CM

## 2017-05-24 DIAGNOSIS — Z85.831 PERSONAL HISTORY OF MALIGNANT NEOPLASM OF SOFT TISSUE: Chronic | ICD-10-CM

## 2017-05-24 DIAGNOSIS — R19.09 OTHER INTRA-ABDOMINAL AND PELVIC SWELLING, MASS AND LUMP: ICD-10-CM

## 2017-05-24 LAB — RH IG SCN BLD-IMP: NEGATIVE — SIGNIFICANT CHANGE UP

## 2017-05-24 PROCEDURE — 49320 DIAG LAPARO SEPARATE PROC: CPT

## 2017-05-24 PROCEDURE — 88331 PATH CONSLTJ SURG 1 BLK 1SPC: CPT | Mod: 26

## 2017-05-24 PROCEDURE — 88365 INSITU HYBRIDIZATION (FISH): CPT | Mod: 26,59

## 2017-05-24 PROCEDURE — 88305 TISSUE EXAM BY PATHOLOGIST: CPT | Mod: 26

## 2017-05-24 PROCEDURE — 88342 IMHCHEM/IMCYTCHM 1ST ANTB: CPT | Mod: 26

## 2017-05-24 PROCEDURE — 88189 FLOWCYTOMETRY/READ 16 & >: CPT | Mod: 59

## 2017-05-24 PROCEDURE — 51702 INSERT TEMP BLADDER CATH: CPT | Mod: 59

## 2017-05-24 PROCEDURE — 38747 REMOVE ABDOMINAL LYMPH NODES: CPT | Mod: 59

## 2017-05-24 PROCEDURE — 88364 INSITU HYBRIDIZATION (FISH): CPT | Mod: 26

## 2017-05-24 PROCEDURE — 88341 IMHCHEM/IMCYTCHM EA ADD ANTB: CPT | Mod: 26,59

## 2017-05-24 PROCEDURE — 88307 TISSUE EXAM BY PATHOLOGIST: CPT | Mod: 26

## 2017-05-24 PROCEDURE — 88367 INSITU HYBRIDIZATION AUTO: CPT | Mod: 26

## 2017-05-24 PROCEDURE — 38308 INCISION OF LYMPH CHANNELS: CPT | Mod: 59

## 2017-05-24 RX ORDER — HYDROMORPHONE HYDROCHLORIDE 2 MG/ML
30 INJECTION INTRAMUSCULAR; INTRAVENOUS; SUBCUTANEOUS
Qty: 0 | Refills: 0 | Status: DISCONTINUED | OUTPATIENT
Start: 2017-05-24 | End: 2017-05-24

## 2017-05-24 RX ORDER — ACETAMINOPHEN 500 MG
1000 TABLET ORAL ONCE
Qty: 0 | Refills: 0 | Status: COMPLETED | OUTPATIENT
Start: 2017-05-24 | End: 2017-05-24

## 2017-05-24 RX ORDER — LEVOTHYROXINE SODIUM 125 MCG
1 TABLET ORAL
Qty: 0 | Refills: 0 | COMMUNITY

## 2017-05-24 RX ORDER — SODIUM CHLORIDE 9 MG/ML
1000 INJECTION, SOLUTION INTRAVENOUS
Qty: 0 | Refills: 0 | Status: DISCONTINUED | OUTPATIENT
Start: 2017-05-24 | End: 2017-05-25

## 2017-05-24 RX ORDER — CHOLECALCIFEROL (VITAMIN D3) 125 MCG
1 CAPSULE ORAL
Qty: 0 | Refills: 0 | COMMUNITY

## 2017-05-24 RX ORDER — HYDROMORPHONE HYDROCHLORIDE 2 MG/ML
1 INJECTION INTRAMUSCULAR; INTRAVENOUS; SUBCUTANEOUS
Qty: 0 | Refills: 0 | Status: DISCONTINUED | OUTPATIENT
Start: 2017-05-24 | End: 2017-05-24

## 2017-05-24 RX ORDER — ONDANSETRON 8 MG/1
4 TABLET, FILM COATED ORAL ONCE
Qty: 0 | Refills: 0 | Status: DISCONTINUED | OUTPATIENT
Start: 2017-05-24 | End: 2017-05-24

## 2017-05-24 RX ORDER — NALOXONE HYDROCHLORIDE 4 MG/.1ML
0.1 SPRAY NASAL
Qty: 0 | Refills: 0 | Status: DISCONTINUED | OUTPATIENT
Start: 2017-05-24 | End: 2017-05-24

## 2017-05-24 RX ORDER — ONDANSETRON 8 MG/1
4 TABLET, FILM COATED ORAL EVERY 6 HOURS
Qty: 0 | Refills: 0 | Status: DISCONTINUED | OUTPATIENT
Start: 2017-05-24 | End: 2017-05-24

## 2017-05-24 RX ORDER — ENOXAPARIN SODIUM 100 MG/ML
40 INJECTION SUBCUTANEOUS DAILY
Qty: 0 | Refills: 0 | Status: DISCONTINUED | OUTPATIENT
Start: 2017-05-24 | End: 2017-05-25

## 2017-05-24 RX ADMIN — HYDROMORPHONE HYDROCHLORIDE 1 MILLIGRAM(S): 2 INJECTION INTRAMUSCULAR; INTRAVENOUS; SUBCUTANEOUS at 13:45

## 2017-05-24 RX ADMIN — ENOXAPARIN SODIUM 40 MILLIGRAM(S): 100 INJECTION SUBCUTANEOUS at 21:10

## 2017-05-24 RX ADMIN — HYDROMORPHONE HYDROCHLORIDE 1 MILLIGRAM(S): 2 INJECTION INTRAMUSCULAR; INTRAVENOUS; SUBCUTANEOUS at 13:30

## 2017-05-24 RX ADMIN — SODIUM CHLORIDE 60 MILLILITER(S): 9 INJECTION, SOLUTION INTRAVENOUS at 21:10

## 2017-05-24 NOTE — PATIENT PROFILE ADULT. - PRESSURE ULCER(S)
I will STOP taking the medications listed below when I get home from the hospital:    Fish Oil oral capsule  -- 1 cap(s) by mouth once a day    Xifaxan 550 mg oral tablet  -- 1 tab(s) by mouth 2 times a day no

## 2017-05-24 NOTE — CHART NOTE - NSCHARTNOTEFT_GEN_A_CORE
Post-operative check     Patient is a 77y old  Male who presents with a chief complaint of "I am having a lymph node/ mass removed from my abdomen/liver area" (11 May 2017 14:07)      STATUS POST:  s/p lap bx of periportal mass  POST OPERATIVE DAY #: 0    SUBJECTIVE: pain controlled, no N/V/SOB/CP/dizziness     PHYSICAL EXAM:  GA: NAD  Abdomen:  -  soft, non distended, appropriate incisional tenderness  - Incision: clean/dry/intact     Vitals/Labs:  Vital Signs Last 24 Hrs  T(C): 36.7, Max: 36.8 (05-24 @ 13:15)  T(F): 98.1, Max: 98.2 (05-24 @ 13:15)  HR: 76 (61 - 98)  BP: 145/71 (121/72 - 163/78)  BP(mean): --  RR: 18 (12 - 18)  SpO2: 97% (94% - 100%)    I&O's Detail    I & Os for current day (as of 24 May 2017 18:28)  =============================================  IN:    lactated ringers.: 360 ml    Total IN: 360 ml  ---------------------------------------------  OUT:    Voided: 100 ml    Total OUT: 100 ml  ---------------------------------------------  Total NET: 260 ml      A/P KISHA NORIEGA 77y Male s/p lap bx of periportal mass  - Diet: reg  - Pain control   - Input and outputs   MEDICATIONS  (STANDING):  lactated ringers. 1000milliLiter(s) IV Continuous <Continuous>  enoxaparin Injectable 40milliGRAM(s) SubCutaneous daily  acetaminophen  IVPB. 1000milliGRAM(s) IV Intermittent once  acetaminophen  IVPB. 1000milliGRAM(s) IV Intermittent once    D team Surgery  Pager: [phone]  Eloy Kang M.D. (PGY-1)

## 2017-05-24 NOTE — BRIEF OPERATIVE NOTE - OPERATION/FINDINGS
large mass adherent to first portion of duodenum. Laparoscopic biopsy of mass performed with placement of fibrillar and evacel. A liver retractor was used to provide visualization of the lesion. The main port site was closed with suture passer for figure of 8 suture using 0-vicry. The skin was closed with 4-0 monocryl. The patient tolerated the procedure. He was extubated and brought to the PACU in stable condition.

## 2017-05-25 ENCOUNTER — TRANSCRIPTION ENCOUNTER (OUTPATIENT)
Age: 77
End: 2017-05-25

## 2017-05-25 VITALS
DIASTOLIC BLOOD PRESSURE: 63 MMHG | TEMPERATURE: 98 F | OXYGEN SATURATION: 96 % | RESPIRATION RATE: 18 BRPM | SYSTOLIC BLOOD PRESSURE: 151 MMHG | HEART RATE: 70 BPM

## 2017-05-25 LAB
BUN SERPL-MCNC: 22 MG/DL — SIGNIFICANT CHANGE UP (ref 7–23)
CALCIUM SERPL-MCNC: 9.3 MG/DL — SIGNIFICANT CHANGE UP (ref 8.4–10.5)
CHLORIDE SERPL-SCNC: 98 MMOL/L — SIGNIFICANT CHANGE UP (ref 98–107)
CO2 SERPL-SCNC: 20 MMOL/L — LOW (ref 22–31)
CREAT SERPL-MCNC: 1.2 MG/DL — SIGNIFICANT CHANGE UP (ref 0.5–1.3)
GLUCOSE SERPL-MCNC: 122 MG/DL — HIGH (ref 70–99)
HCT VFR BLD CALC: 33.2 % — LOW (ref 39–50)
HGB BLD-MCNC: 10.2 G/DL — LOW (ref 13–17)
MCHC RBC-ENTMCNC: 26.2 PG — LOW (ref 27–34)
MCHC RBC-ENTMCNC: 30.7 % — LOW (ref 32–36)
MCV RBC AUTO: 85.3 FL — SIGNIFICANT CHANGE UP (ref 80–100)
PLATELET # BLD AUTO: 267 K/UL — SIGNIFICANT CHANGE UP (ref 150–400)
PMV BLD: 9.9 FL — SIGNIFICANT CHANGE UP (ref 7–13)
POTASSIUM SERPL-MCNC: 5 MMOL/L — SIGNIFICANT CHANGE UP (ref 3.5–5.3)
POTASSIUM SERPL-SCNC: 5 MMOL/L — SIGNIFICANT CHANGE UP (ref 3.5–5.3)
RBC # BLD: 3.89 M/UL — LOW (ref 4.2–5.8)
RBC # FLD: 16.2 % — HIGH (ref 10.3–14.5)
SODIUM SERPL-SCNC: 137 MMOL/L — SIGNIFICANT CHANGE UP (ref 135–145)
WBC # BLD: 6.67 K/UL — SIGNIFICANT CHANGE UP (ref 3.8–10.5)
WBC # FLD AUTO: 6.67 K/UL — SIGNIFICANT CHANGE UP (ref 3.8–10.5)

## 2017-05-25 PROCEDURE — 99024 POSTOP FOLLOW-UP VISIT: CPT

## 2017-05-25 RX ORDER — ACETAMINOPHEN 500 MG
650 TABLET ORAL EVERY 6 HOURS
Qty: 0 | Refills: 0 | Status: DISCONTINUED | OUTPATIENT
Start: 2017-05-25 | End: 2017-05-25

## 2017-05-25 RX ORDER — LEVOTHYROXINE SODIUM 125 MCG
25 TABLET ORAL DAILY
Qty: 0 | Refills: 0 | Status: DISCONTINUED | OUTPATIENT
Start: 2017-05-25 | End: 2017-05-25

## 2017-05-25 RX ORDER — ACETAMINOPHEN 500 MG
2 TABLET ORAL
Qty: 0 | Refills: 0 | COMMUNITY
Start: 2017-05-25

## 2017-05-25 RX ADMIN — Medication 25 MICROGRAM(S): at 12:07

## 2017-05-25 NOTE — PROGRESS NOTE ADULT - ASSESSMENT
ASSESSMENT/PLAN: KISHA NORIEGA 77y Male s/p laparoscopic biopsy of periportal mass   - Diet: regular   - Pain control   - Input and outputs   - DVT ppx   - OOB/incentive spirometry   MEDICATIONS  (STANDING):  lactated ringers. 1000milliLiter(s) IV Continuous <Continuous>  enoxaparin Injectable 40milliGRAM(s) SubCutaneous daily ASSESSMENT/PLAN: KISHAROSALINDA NORIEGA 77y Male s/p laparoscopic biopsy of periportal mass   - Diet: regular   - Pain control   - Input and outputs   - DVT ppx   - OOB/incentive spirometry   - dc home today

## 2017-05-25 NOTE — DISCHARGE NOTE ADULT - CARE PLAN
Principal Discharge DX:	Intra-abdominal and pelvic swelling of mass or lump  Goal:	wound healing  Instructions for follow-up, activity and diet:	WOUND CARE: Allow steri strips to fall off on their own.  Do not scrub or rub incision sites.     BATHING: Please do not submerge wound underwater. You may shower and/or sponge bathe. It is OK to wash drain wound site.  ACTIVITY: No heavy lifting or straining. Otherwise, you may return to your usual level of physical activity. If you are taking narcotic pain medication (such as Percocet) DO NOT drive a car, operate machinery or make important decisions.  DIET: Return to your usual diet.  NOTIFY YOUR SURGEON IF: You have any bleeding that does not stop, any pus draining from your wound(s), any fever (over 100.4 F) or chills, persistent nausea/vomiting, persistent diarrhea, or if your pain is not controlled on your discharge pain medications.  FOLLOW-UP: Please follow up with your primary care physician in one week regarding your hospitalization.  Please follow up with Dr. Jarrett in one week.  Secondary Diagnosis:	Hypothyroidism  Instructions for follow-up, activity and diet:	Please follow up with your primary care physician regarding your hospitalization

## 2017-05-25 NOTE — PROGRESS NOTE ADULT - SUBJECTIVE AND OBJECTIVE BOX
Patient is a 77y old  Male who presents with a chief complaint of Patient scheduled for Lung Surgery. (24 May 2017 19:06)    Surgical oncology service   Pager: [phone]  POD#1  Procedure: laparoscopic biopsy of periportal mass     SUBJECTIVE:     OBJECTIVE:  PHYSICAL EXAM:  GA: NAD  Abdomen: Incision: clean/dry/intact, incisional tenderness, non-distended, soft        Vitals/Labs: Patient is a 77y old  Male who presents with a chief complaint of Patient scheduled for Lung Surgery. (24 May 2017 19:06)    Surgical oncology service   Pager: [phone]  POD#1    Procedure: laparoscopic biopsy of periportal mass     SUBJECTIVE:  feeling "so, so," pain "not too bad." told cld, would like to go home    Vital Signs Last 24 Hrs  T(C): 36.7, Max: 36.8 (05-24 @ 13:15)  T(F): 98.1, Max: 98.2 (05-24 @ 13:15)  HR: 88 (61 - 104)  BP: 146/69 (124/68 - 163/78)  BP(mean): --  RR: 18 (12 - 18)  SpO2: 100% (94% - 100%)    I&O's Detail    I & Os for current day (as of 25 May 2017 09:06)  =============================================  IN:    lactated ringers.: 360 ml    Oral Fluid: 200 ml    Total IN: 560 ml  ---------------------------------------------  OUT:    Voided: 750 ml    Total OUT: 750 ml  ---------------------------------------------  Total NET: -190 ml                            10.2   6.67  )-----------( 267      ( 25 May 2017 06:00 )             33.2       05-25    137  |  98  |  22  ----------------------------<  122<H>  5.0   |  20<L>  |  1.20    Ca    9.3      25 May 2017 06:00        PHYSICAL EXAM:  Gen: awake and alert, NAD  Abdomen: Incision: clean/dry/intact, mild incisional tenderness, non-distended, soft

## 2017-05-25 NOTE — DISCHARGE NOTE ADULT - HOSPITAL COURSE
78 yo M with PMH of anemia, basal cell carcinoma, fibrosarcoma, hodgkin's lymphoma, hypothyroidism, mitral valve prolapse, prostate cancer and hx of Chemotherapy.  Presents to PST with a preop dx of other intra abdominal and pelvic swelling, mass and lump and to be evaluated for a scheduled laparoscopic, possible open sampling of peritoneal mass on 5/24/17. Pt states once a year sees an oncologist who performs blood work and noted HGB and iron decreasing. Had other test and a CT scan and noted enlarged lymph node then a pet scan done. Had 2 endoscopies and sampling were not successful. Pt had a PNA after second EGD for which was hospitalized few days. Pt was then referred to Dr Staley for further evaluation and surgical intervention at this time.     Pt admitted to general surgery and went to the OR with Dr. Jarrett for a diagnostic lap, biopsy of periportal mass.  Intraop findings: large mass adherent to first portion of duodenum. Laparoscopic biopsy of mass performed with placement of fibrillar and evacel. A liver retractor was used to provide visualization of the lesion. The main port site was closed with suture passer for figure of 8 suture using 0-vicry. The skin was closed with 4-0 monocryl. The patient tolerated the procedure. He was extubated and brought to the PACU in stable condition.  Pt tolerated procedure well, without complication.  Post op pt transferred from PACU to floor. Diet was restarted and advanced as tolerated.  Pain control was transitioned from IV to PO.  Pt currently ambulating, voiding, tolerating a regular diet, with pain well controlled on PO pain meds.  Per team and attending, pt is hemodynamically stable for discharge home to follow up as an outpatient. 78 yo M with PMH of anemia, basal cell carcinoma, fibrosarcoma, hodgkin's lymphoma, hypothyroidism, mitral valve prolapse, prostate cancer and hx of Chemotherapy.  Presents to PST with a preop dx of other intra abdominal and pelvic swelling, mass and lump and to be evaluated for a scheduled laparoscopic, possible open sampling of peritoneal mass on 5/24/17. Pt states once a year sees an oncologist who performs blood work and noted HGB and iron decreasing. Had other test and a CT scan and noted enlarged lymph node then a pet scan done. Had 2 endoscopies and sampling were not successful. Pt had a PNA after second EGD for which was hospitalized few days. Pt was then referred to Dr Jarrett for further evaluation and surgical intervention at this time.     Pt admitted to general surgery and went to the OR with Dr. Jarrett for a diagnostic lap, biopsy of periportal mass.  Intraop findings: large mass adherent to first portion of duodenum. Laparoscopic biopsy of mass performed with placement of fibrillar and evacel. A liver retractor was used to provide visualization of the lesion. The main port site was closed with suture passer for figure of 8 suture using 0-vicry. The skin was closed with 4-0 monocryl. The patient tolerated the procedure. He was extubated and brought to the PACU in stable condition.  Pt tolerated procedure well, without complication.  Post op pt transferred from PACU to floor. Diet was restarted and advanced as tolerated.  Pain control was transitioned from IV to PO.  Pt currently ambulating, voiding, tolerating a regular diet, with pain well controlled on PO pain meds.  Per team and attending, pt is hemodynamically stable for discharge home to follow up as an outpatient.

## 2017-05-25 NOTE — DISCHARGE NOTE ADULT - PLAN OF CARE
wound healing WOUND CARE: Allow steri strips to fall off on their own.  Do not scrub or rub incision sites.     BATHING: Please do not submerge wound underwater. You may shower and/or sponge bathe. It is OK to wash drain wound site.  ACTIVITY: No heavy lifting or straining. Otherwise, you may return to your usual level of physical activity. If you are taking narcotic pain medication (such as Percocet) DO NOT drive a car, operate machinery or make important decisions.  DIET: Return to your usual diet.  NOTIFY YOUR SURGEON IF: You have any bleeding that does not stop, any pus draining from your wound(s), any fever (over 100.4 F) or chills, persistent nausea/vomiting, persistent diarrhea, or if your pain is not controlled on your discharge pain medications.  FOLLOW-UP: Please follow up with your primary care physician in one week regarding your hospitalization.  Please follow up with Dr. Jarrett in one week. Please follow up with your primary care physician regarding your hospitalization

## 2017-05-25 NOTE — DISCHARGE NOTE ADULT - MEDICATION SUMMARY - MEDICATIONS TO TAKE
I will START or STAY ON the medications listed below when I get home from the hospital:    acetaminophen 325 mg oral tablet  -- 2 tab(s) by mouth every 6 hours, As needed, mild and moderate and severe pain  -- Indication: For pain    levothyroxine 25 mcg (0.025 mg) oral tablet  -- 1 tab(s) by mouth once a day  -- Indication: For Hypothyroidism    Vitamin D3 1000 intl units oral capsule  -- 1 cap(s) by mouth once a day  -- Indication: For supplement

## 2017-05-25 NOTE — DISCHARGE NOTE ADULT - PATIENT PORTAL LINK FT
“You can access the Follow[employer] Patient Portal, offered by [facility], by registering with the following website: [email or web address]”

## 2017-05-25 NOTE — DISCHARGE NOTE ADULT - CARE PROVIDER_API CALL
Luís Jarrett (MD), Surgery  [address] [place] [address]  Phone: [phone]  Fax: [phone]

## 2017-05-26 LAB — TM INTERPRETATION: SIGNIFICANT CHANGE UP

## 2017-05-31 LAB — HEMATOPATHOLOGY REPORT: SIGNIFICANT CHANGE UP

## 2017-06-06 ENCOUNTER — MESSAGE (OUTPATIENT)
Age: 77
End: 2017-06-06

## 2017-06-08 ENCOUNTER — APPOINTMENT (OUTPATIENT)
Dept: HEMATOLOGY ONCOLOGY | Facility: CLINIC | Age: 77
End: 2017-06-08

## 2017-06-08 ENCOUNTER — APPOINTMENT (OUTPATIENT)
Dept: SURGICAL ONCOLOGY | Facility: CLINIC | Age: 77
End: 2017-06-08

## 2017-06-08 ENCOUNTER — OUTPATIENT (OUTPATIENT)
Dept: OUTPATIENT SERVICES | Facility: HOSPITAL | Age: 77
LOS: 1 days | Discharge: HOME | End: 2017-06-08

## 2017-06-08 VITALS
BODY MASS INDEX: 23.66 KG/M2 | HEIGHT: 65 IN | HEART RATE: 86 BPM | OXYGEN SATURATION: 96 % | DIASTOLIC BLOOD PRESSURE: 80 MMHG | TEMPERATURE: 98.4 F | SYSTOLIC BLOOD PRESSURE: 132 MMHG | WEIGHT: 142 LBS

## 2017-06-08 DIAGNOSIS — Z90.79 ACQUIRED ABSENCE OF OTHER GENITAL ORGAN(S): Chronic | ICD-10-CM

## 2017-06-08 DIAGNOSIS — Z90.89 ACQUIRED ABSENCE OF OTHER ORGANS: Chronic | ICD-10-CM

## 2017-06-08 DIAGNOSIS — Z85.71 PERSONAL HISTORY OF HODGKIN LYMPHOMA: ICD-10-CM

## 2017-06-08 DIAGNOSIS — Z85.831 PERSONAL HISTORY OF MALIGNANT NEOPLASM OF SOFT TISSUE: Chronic | ICD-10-CM

## 2017-06-08 DIAGNOSIS — D50.9 IRON DEFICIENCY ANEMIA, UNSPECIFIED: ICD-10-CM

## 2017-06-08 DIAGNOSIS — Z98.890 OTHER SPECIFIED POSTPROCEDURAL STATES: Chronic | ICD-10-CM

## 2017-06-08 DIAGNOSIS — C85.90 NON-HODGKIN LYMPHOMA, UNSPECIFIED, UNSPECIFIED SITE: ICD-10-CM

## 2017-06-08 DIAGNOSIS — C49.9 MALIGNANT NEOPLASM OF CONNECTIVE AND SOFT TISSUE, UNSPECIFIED: ICD-10-CM

## 2017-06-08 DIAGNOSIS — C61 MALIGNANT NEOPLASM OF PROSTATE: ICD-10-CM

## 2017-06-08 RX ORDER — UBIDECARENONE/VIT E ACET 100MG-5
1000 CAPSULE ORAL
Refills: 0 | Status: ACTIVE | COMMUNITY

## 2017-06-08 RX ORDER — LEVOTHYROXINE SODIUM 0.03 MG/1
25 TABLET ORAL
Refills: 0 | Status: ACTIVE | COMMUNITY

## 2017-09-27 ENCOUNTER — APPOINTMENT (OUTPATIENT)
Dept: UROLOGY | Facility: CLINIC | Age: 77
End: 2017-09-27
Payer: MEDICARE

## 2017-09-27 LAB
APPEARANCE: CLEAR
BACTERIA: NEGATIVE
BILIRUBIN URINE: NEGATIVE
BLOOD URINE: NEGATIVE
COLOR: YELLOW
GLUCOSE QUALITATIVE U: NORMAL MG/DL
KETONES URINE: NEGATIVE
LEUKOCYTE ESTERASE URINE: NEGATIVE
MICROSCOPIC-UA: NORMAL
NITRITE URINE: NEGATIVE
PH URINE: 7
PROTEIN URINE: NEGATIVE MG/DL
RED BLOOD CELLS URINE: 4 /HPF
SPECIFIC GRAVITY URINE: 1.01
SQUAMOUS EPITHELIAL CELLS: 0 /HPF
UROBILINOGEN URINE: NORMAL MG/DL
WHITE BLOOD CELLS URINE: 0 /HPF

## 2017-09-27 PROCEDURE — 99214 OFFICE O/P EST MOD 30 MIN: CPT

## 2017-09-28 LAB — CORE LAB FLUID CYTOLOGY: NORMAL

## 2018-02-05 NOTE — H&P PST ADULT - NEGATIVE OPHTHALMOLOGIC SYMPTOMS
Amitriptyline  1/26/17 #180 with 3 refills  LOV: 11/3/17 no pain L/no irritation R/no scleral injection L/no irritation L/no lacrimation R/no loss of vision R/no diplopia/no scleral injection R/no discharge R/no discharge L/no photophobia/no lacrimation L/no blurred vision R/no pain R/no loss of vision L/no blurred vision L

## 2018-07-16 PROBLEM — D64.9 ANEMIA, UNSPECIFIED: Chronic | Status: ACTIVE | Noted: 2017-03-23

## 2018-11-15 ENCOUNTER — APPOINTMENT (OUTPATIENT)
Dept: UROLOGY | Facility: CLINIC | Age: 78
End: 2018-11-15
Payer: MEDICARE

## 2018-11-15 DIAGNOSIS — C61 MALIGNANT NEOPLASM OF PROSTATE: ICD-10-CM

## 2018-11-15 DIAGNOSIS — C62.90 MALIGNANT NEOPLASM OF UNSPECIFIED TESTIS, UNSPECIFIED WHETHER DESCENDED OR UNDESCENDED: ICD-10-CM

## 2018-11-15 PROBLEM — I34.1 NONRHEUMATIC MITRAL (VALVE) PROLAPSE: Chronic | Status: ACTIVE | Noted: 2017-03-23

## 2018-11-15 PROBLEM — R59.9 ENLARGED LYMPH NODES, UNSPECIFIED: Chronic | Status: ACTIVE | Noted: 2017-03-23

## 2018-11-15 PROBLEM — C49.9 MALIGNANT NEOPLASM OF CONNECTIVE AND SOFT TISSUE, UNSPECIFIED: Chronic | Status: ACTIVE | Noted: 2017-03-23

## 2018-11-15 PROBLEM — E03.9 HYPOTHYROIDISM, UNSPECIFIED: Chronic | Status: ACTIVE | Noted: 2017-03-23

## 2018-11-15 PROBLEM — C81.90 HODGKIN LYMPHOMA, UNSPECIFIED, UNSPECIFIED SITE: Chronic | Status: ACTIVE | Noted: 2017-03-23

## 2018-11-15 PROBLEM — C44.91 BASAL CELL CARCINOMA OF SKIN, UNSPECIFIED: Chronic | Status: ACTIVE | Noted: 2017-03-23

## 2018-11-15 PROCEDURE — 99213 OFFICE O/P EST LOW 20 MIN: CPT

## 2018-11-16 LAB
APPEARANCE: CLEAR
BACTERIA: NEGATIVE
BILIRUBIN URINE: NEGATIVE
BLOOD URINE: NEGATIVE
COLOR: YELLOW
CORE LAB FLUID CYTOLOGY: NORMAL
GLUCOSE QUALITATIVE U: NEGATIVE MG/DL
KETONES URINE: NEGATIVE
LEUKOCYTE ESTERASE URINE: NEGATIVE
MICROSCOPIC-UA: NORMAL
NITRITE URINE: NEGATIVE
PH URINE: 7.5
PROTEIN URINE: NEGATIVE MG/DL
PSA FREE FLD-MCNC: NORMAL
PSA FREE SERPL-MCNC: <0.01 NG/ML
PSA SERPL-MCNC: <0.01 NG/ML
RED BLOOD CELLS URINE: 0 /HPF
SPECIFIC GRAVITY URINE: 1.01
SQUAMOUS EPITHELIAL CELLS: 0 /HPF
UROBILINOGEN URINE: NEGATIVE MG/DL
WHITE BLOOD CELLS URINE: 0 /HPF

## 2019-05-30 ENCOUNTER — APPOINTMENT (OUTPATIENT)
Dept: GASTROENTEROLOGY | Facility: CLINIC | Age: 79
End: 2019-05-30
Payer: MEDICARE

## 2019-05-30 VITALS
HEART RATE: 89 BPM | BODY MASS INDEX: 26.29 KG/M2 | WEIGHT: 158 LBS | DIASTOLIC BLOOD PRESSURE: 81 MMHG | SYSTOLIC BLOOD PRESSURE: 135 MMHG

## 2019-05-30 DIAGNOSIS — K44.9 DIAPHRAGMATIC HERNIA W/OUT OBSTRUCTION OR GANGRENE: ICD-10-CM

## 2019-05-30 DIAGNOSIS — C83.30 DIFFUSE LARGE B-CELL LYMPHOMA, UNSPECIFIED SITE: ICD-10-CM

## 2019-05-30 PROCEDURE — 99204 OFFICE O/P NEW MOD 45 MIN: CPT

## 2019-05-30 PROCEDURE — 99214 OFFICE O/P EST MOD 30 MIN: CPT

## 2019-05-30 RX ORDER — AMLODIPINE BESYLATE 5 MG/1
5 TABLET ORAL
Refills: 0 | Status: ACTIVE | COMMUNITY

## 2019-05-30 NOTE — HISTORY OF PRESENT ILLNESS
[FreeTextEntry1] : The patient has a history of non-Hodgkin's lymphoma and a recent PET scan showed he responded to medication. He had a hiatal hernia noted as well as a PET scan he denies dysphagia or heartburn but is in occasional regurgitation for which he takes Pepcid A/C with good results he denies melena or abdominal cramps or pain

## 2019-05-30 NOTE — ASSESSMENT
[FreeTextEntry1] : Patient with lymphoma recently a PET scan showed hiatal hernia with regurgitation which responds to Pepcid AC he has been advised to have an upper endoscopy for evaluation but is reluctant to do so at this time

## 2019-08-29 ENCOUNTER — APPOINTMENT (OUTPATIENT)
Dept: GASTROENTEROLOGY | Facility: CLINIC | Age: 79
End: 2019-08-29

## 2019-11-21 ENCOUNTER — APPOINTMENT (OUTPATIENT)
Dept: UROLOGY | Facility: CLINIC | Age: 79
End: 2019-11-21

## 2020-09-16 NOTE — ASU PATIENT PROFILE, ADULT - PREOP PAIN SCORE
[FreeTextEntry1] : Other than as documented here and in the HPI, the thirteen point ROS is negative
0

## 2021-09-27 NOTE — H&P PST ADULT - LAST STRESS TEST
Pt was a no show, no call to cardiac rehab, left message for pt to call back to discuss return.  Awaiting call back.     denies

## 2022-12-27 NOTE — ASU PATIENT PROFILE, ADULT - FALL HARM RISK TYPE OF ASSESSMENT
Specialty Pharmacy - Refill Coordination    Specialty Medication Orders Linked to Encounter      Flowsheet Row Most Recent Value   Medication #1 alirocumab (PRALUENT PEN) 75 mg/mL PnIj (Order#[id number], Rx#[id number])            Refill Questions - Documented Responses      Flowsheet Row Most Recent Value   Patient Availability and HIPAA Verification    Does patient want to proceed with activity? Yes   HIPAA/medical authority confirmed? Yes   Relationship to patient of person spoken to? Self   Refill Screening Questions    Would patient like to speak to a pharmacist? No   When does the patient need to receive the medication? 01/01/23   Refill Delivery Questions    How will the patient receive the medication? [facility]   When does the patient need to receive the medication? 01/01/23   Shipping Address Home   Address in [facility] confirmed and updated if neccessary? Yes   Expected Copay ($) 25   Is the patient able to afford the medication copay? Yes   Payment Method CC on file   Days supply of Refill 28   Supplies needed? No supplies needed   Refill activity completed? Yes   Refill activity plan Refill scheduled   Shipment/Pickup Date: 12/29/22            Current Outpatient Medications   Medication Sig    alirocumab (PRALUENT PEN) 75 mg/mL PnIj Inject 1 mL (75 mg total) into the skin every 14 (fourteen) days.    aspirin (ECOTRIN) 81 MG EC tablet TAKE 1 TABLET BY MOUTH EVERY DAY    ciprofloxacin HCl (CILOXAN) 0.3 % ophthalmic solution 5 drops to left EAR twice daily X 10 days (Patient not taking: Reported on 7/29/2022)    ciprofloxacin-dexamethasone 0.3-0.1% (CIPRODEX) 0.3-0.1 % DrpS Place 4 drops into the left ear 2 (two) times daily. (Patient not taking: Reported on 7/29/2022)    clopidogreL (PLAVIX) 75 mg tablet TAKE 1 TABLET BY MOUTH EVERY DAY    cyanocobalamin (VITAMIN B-12) 1000 MCG tablet Take 1 tablet (1,000 mcg total) by mouth once daily.    FOLBEE 2.5-25-1 mg Tab TAKE 1 TABLET BY MOUTH EVERY DAY     gemfibroziL (LOPID) 600 MG tablet TAKE 1 TABLET (600 MG TOTAL) BY MOUTH 2 (TWO) TIMES DAILY BEFORE MEALS    HYDROcodone-acetaminophen (NORCO) 5-325 mg per tablet Take 1 tablet by mouth every 6 (six) hours as needed for Pain.    ibuprofen (ADVIL,MOTRIN) 200 MG tablet Take by mouth every 6 (six) hours as needed.    ipratropium (ATROVENT) 42 mcg (0.06 %) nasal spray 2 sprays by Nasal route 3 (three) times daily. Use 2-3 times per day if necessary for chronic nasal drip    lisinopriL 10 MG tablet TAKE 1 TABLET BY MOUTH EVERY DAY    pantoprazole (PROTONIX) 40 MG tablet Take 40 mg by mouth.    sildenafiL (VIAGRA) 50 MG tablet Take 1 tablet (50 mg total) by mouth daily as needed for Erectile Dysfunction. (Patient not taking: No sig reported)    triamcinolone acetonide 0.1% (KENALOG) 0.1 % cream Apply topically.    zolpidem (AMBIEN) 5 MG Tab Take 1 tablet (5 mg total) by mouth nightly as needed.   Last reviewed on 8/18/2022  1:55 PM by Marimar Jimenez NP    Review of patient's allergies indicates:   Allergen Reactions    Statins-hmg-coa reductase inhibitors Other (See Comments)     Not allergy, makes him feel awful    Last reviewed on  8/18/2022 1:11 PM by Dunia Bowie      Tasks added this encounter   No tasks added.   Tasks due within next 3 months   12/22/2022 - Refill Call (Auto Added)     Asael Weinstein, PharmD  [facility]  [address] [place] [id number]  Phone: [phone]  Fax: [phone]         Admission

## 2024-08-21 NOTE — PATIENT PROFILE ADULT. - NS PRO MODE OF ARRIVAL
stretcher
Quality 226: Preventive Care And Screening: Tobacco Use: Screening And Cessation Intervention: Patient screened for tobacco use and is an ex/non-smoker
Detail Level: Detailed

## 2024-08-26 NOTE — ED PROVIDER NOTE - DETAILS:
X Size Of Lesion In Cm (Optional): 0
Body Location Override (Optional - Billing Will Still Be Based On Selected Body Map Location If Applicable): left cheek
Detail Level: Detailed
The history, relevant review of systems, past medical and surgical history, medical decision making, and physical examination was documented by the scribe in my presence and I attest to the accuracy of the documentation.

## 2024-10-02 NOTE — H&P PST ADULT - HISTORY OF PRESENT ILLNESS
A wheelchair was provided for the pt   78 yo M with PMH of anemia, basal cell carcinoma, fibrosarcoma, hodgkin's lymphoma, hypothyroidism, mitral valve prolapse, prostate cancer. Presents to PST with a preop dx of other intra abdominal and pelvic swelling, mass and lump and to be evaluated for a scheduled laparoscopic, possible open sampling of peritoneal mass on 5/24/17. Pt states once a year sees an oncologist who performs blood work and noted HGB and iron decreasing. Had other test and a CT scan and noted enlarged lymph node then a pet scan done. Had 2 endoscopies and sampling were not successful. Pt had a PNA after second EGD for which was hospitalized few days. Pt was then referred to Dr Staley for further evaluation and surgical intervention at this time.     PSH:  H/O bilateral inguinal hernia repair  1991, H/O prostatectomy  2001, History of sarcoma  excision - 1993, S/P hernia repair    S/P prostatectomy, S/P T&A (status post tonsillectomy and adenoidectomy) 78 yo M with PMH of anemia, basal cell carcinoma, fibrosarcoma, hodgkin's lymphoma, hypothyroidism, mitral valve prolapse, prostate cancer and hx of Chemotherapy.  Presents to PST with a preop dx of other intra abdominal and pelvic swelling, mass and lump and to be evaluated for a scheduled laparoscopic, possible open sampling of peritoneal mass on 5/24/17. Pt states once a year sees an oncologist who performs blood work and noted HGB and iron decreasing. Had other test and a CT scan and noted enlarged lymph node then a pet scan done. Had 2 endoscopies and sampling were not successful. Pt had a PNA after second EGD for which was hospitalized few days. Pt was then referred to Dr Staley for further evaluation and surgical intervention at this time.